# Patient Record
Sex: FEMALE | Race: WHITE | NOT HISPANIC OR LATINO | Employment: FULL TIME | URBAN - METROPOLITAN AREA
[De-identification: names, ages, dates, MRNs, and addresses within clinical notes are randomized per-mention and may not be internally consistent; named-entity substitution may affect disease eponyms.]

---

## 2017-02-27 ENCOUNTER — OFFICE VISIT (OUTPATIENT)
Dept: OBGYN | Facility: CLINIC | Age: 47
End: 2017-02-27
Payer: COMMERCIAL

## 2017-02-27 VITALS
SYSTOLIC BLOOD PRESSURE: 126 MMHG | HEIGHT: 66 IN | WEIGHT: 209.9 LBS | HEART RATE: 79 BPM | DIASTOLIC BLOOD PRESSURE: 82 MMHG | OXYGEN SATURATION: 98 % | BODY MASS INDEX: 33.73 KG/M2

## 2017-02-27 DIAGNOSIS — Z01.419 ENCOUNTER FOR GYNECOLOGICAL EXAMINATION WITHOUT ABNORMAL FINDING: Primary | ICD-10-CM

## 2017-02-27 DIAGNOSIS — D05.00 BREAST NEOPLASM, TIS (LCIS), UNSPECIFIED LATERALITY: ICD-10-CM

## 2017-02-27 LAB
CHOLEST SERPL-MCNC: 198 MG/DL
FERRITIN SERPL-MCNC: 63 NG/ML (ref 8–252)
HBA1C MFR BLD: 5.4 % (ref 4.3–6)
IRON SATN MFR SERPL: 36 % (ref 15–46)
IRON SERPL-MCNC: 104 UG/DL (ref 35–180)
TIBC SERPL-MCNC: 287 UG/DL (ref 240–430)
TSH SERPL DL<=0.05 MIU/L-ACNC: 1.42 MU/L (ref 0.4–4)

## 2017-02-27 PROCEDURE — 87389 HIV-1 AG W/HIV-1&-2 AB AG IA: CPT | Performed by: OBSTETRICS & GYNECOLOGY

## 2017-02-27 PROCEDURE — 87340 HEPATITIS B SURFACE AG IA: CPT | Performed by: OBSTETRICS & GYNECOLOGY

## 2017-02-27 PROCEDURE — 83550 IRON BINDING TEST: CPT | Performed by: OBSTETRICS & GYNECOLOGY

## 2017-02-27 PROCEDURE — 36415 COLL VENOUS BLD VENIPUNCTURE: CPT | Performed by: OBSTETRICS & GYNECOLOGY

## 2017-02-27 PROCEDURE — 83036 HEMOGLOBIN GLYCOSYLATED A1C: CPT | Performed by: OBSTETRICS & GYNECOLOGY

## 2017-02-27 PROCEDURE — 86803 HEPATITIS C AB TEST: CPT | Performed by: OBSTETRICS & GYNECOLOGY

## 2017-02-27 PROCEDURE — G0145 SCR C/V CYTO,THINLAYER,RESCR: HCPCS | Performed by: OBSTETRICS & GYNECOLOGY

## 2017-02-27 PROCEDURE — 86695 HERPES SIMPLEX TYPE 1 TEST: CPT | Performed by: OBSTETRICS & GYNECOLOGY

## 2017-02-27 PROCEDURE — 86780 TREPONEMA PALLIDUM: CPT | Performed by: OBSTETRICS & GYNECOLOGY

## 2017-02-27 PROCEDURE — 87624 HPV HI-RISK TYP POOLED RSLT: CPT | Performed by: OBSTETRICS & GYNECOLOGY

## 2017-02-27 PROCEDURE — 99396 PREV VISIT EST AGE 40-64: CPT | Performed by: OBSTETRICS & GYNECOLOGY

## 2017-02-27 PROCEDURE — 86696 HERPES SIMPLEX TYPE 2 TEST: CPT | Performed by: OBSTETRICS & GYNECOLOGY

## 2017-02-27 PROCEDURE — 82465 ASSAY BLD/SERUM CHOLESTEROL: CPT | Performed by: OBSTETRICS & GYNECOLOGY

## 2017-02-27 PROCEDURE — 87591 N.GONORRHOEAE DNA AMP PROB: CPT | Performed by: OBSTETRICS & GYNECOLOGY

## 2017-02-27 PROCEDURE — 87491 CHLMYD TRACH DNA AMP PROBE: CPT | Performed by: OBSTETRICS & GYNECOLOGY

## 2017-02-27 PROCEDURE — 83540 ASSAY OF IRON: CPT | Performed by: OBSTETRICS & GYNECOLOGY

## 2017-02-27 PROCEDURE — 84443 ASSAY THYROID STIM HORMONE: CPT | Performed by: OBSTETRICS & GYNECOLOGY

## 2017-02-27 PROCEDURE — 82728 ASSAY OF FERRITIN: CPT | Performed by: OBSTETRICS & GYNECOLOGY

## 2017-02-27 PROCEDURE — 82306 VITAMIN D 25 HYDROXY: CPT | Performed by: OBSTETRICS & GYNECOLOGY

## 2017-02-27 RX ORDER — METRONIDAZOLE 7.5 MG/G
LOTION TOPICAL
Refills: 3 | COMMUNITY
Start: 2017-01-19 | End: 2018-11-16

## 2017-02-27 ASSESSMENT — ANXIETY QUESTIONNAIRES
IF YOU CHECKED OFF ANY PROBLEMS ON THIS QUESTIONNAIRE, HOW DIFFICULT HAVE THESE PROBLEMS MADE IT FOR YOU TO DO YOUR WORK, TAKE CARE OF THINGS AT HOME, OR GET ALONG WITH OTHER PEOPLE: SOMEWHAT DIFFICULT
1. FEELING NERVOUS, ANXIOUS, OR ON EDGE: SEVERAL DAYS
2. NOT BEING ABLE TO STOP OR CONTROL WORRYING: SEVERAL DAYS
5. BEING SO RESTLESS THAT IT IS HARD TO SIT STILL: NOT AT ALL
3. WORRYING TOO MUCH ABOUT DIFFERENT THINGS: NOT AT ALL
7. FEELING AFRAID AS IF SOMETHING AWFUL MIGHT HAPPEN: NOT AT ALL
GAD7 TOTAL SCORE: 5
6. BECOMING EASILY ANNOYED OR IRRITABLE: MORE THAN HALF THE DAYS

## 2017-02-27 ASSESSMENT — PATIENT HEALTH QUESTIONNAIRE - PHQ9: 5. POOR APPETITE OR OVEREATING: SEVERAL DAYS

## 2017-02-27 NOTE — NURSING NOTE
"Chief Complaint   Patient presents with     Physical     would like labwork       Initial /82 (BP Location: Right arm, Patient Position: Chair, Cuff Size: Adult Large)  Pulse 79  Ht 1.676 m (5' 6\")  Wt 95.2 kg (209 lb 14.4 oz)  SpO2 98%  Breastfeeding? No  BMI 33.88 kg/m2 Estimated body mass index is 33.88 kg/(m^2) as calculated from the following:    Height as of this encounter: 1.676 m (5' 6\").    Weight as of this encounter: 95.2 kg (209 lb 14.4 oz).  Medication Reconciliation: complete   Madelyn Daigle, [facility]  February 27, 2017 1:16 PM        "

## 2017-02-27 NOTE — MR AVS SNAPSHOT
After Visit Summary   2/27/2017    Brianna Wall    MRN: [id number]           Patient Information     Date Of Birth          1970        Visit Information        Provider Department      2/27/2017 1:00 PM Kuldip Pritchett MD [facility]        Care Instructions                                                        If you have any questions regarding your visit, Please contact your care team.     [facility] Access Services: [phone]    [facility] CLINIC HOURS TELEPHONE NUMBER   Kuldip Pritchett M.D.      Brigette-    Rashi Anaya-DEVANTE Samson-Medical Assistant   Monday-[place]  8:00a.m-4:45 p.m  Wednesday-[place] 8:00a.m-4:45 p.m.  Thursday-[place]  8:00a.m-4:45 p.m.  Friday-[place]  8:00a.m-4:45 p.m. [facility]  [address] [place] [address] [address].  [place], [place] [address] [phone] ask for [facility]  [phone] Fax  Imaging [phone]    [facility]  [address] [place] [address] Dr.  [place], [place] [address] [phone]    [facility]  [address]  [place], [place] [address] [phone] ask [facility]  [phone] Fax  Imaging [phone]     Urgent Care locations:    [facility] Monday-Friday  5 pm - 9 pm  Saturday and Sunday   9 am - 5 pm    Monday-Friday   11 am - 9 pm  Saturday and Sunday   9 am - 5 pm   [phone]       If you need a medication refill, please contact your pharmacy. Please allow 3 business days for your refill to be completed.  As always, Thank you for trusting us with your healthcare needs!          Follow-ups after your visit        Who to contact     If you have questions or need follow up information about today's clinic visit or your schedule please contact [facility] directly at [phone].  Normal or non-critical lab and imaging results will be communicated to you by  "MyChart, letter or phone within 4 business days after the clinic has received the results. If you do not hear from us within 7 days, please contact the clinic through [employer] or phone. If you have a critical or abnormal lab result, we will notify you by phone as soon as possible.  Submit refill requests through [employer] or call your pharmacy and they will forward the refill request to us. Please allow 3 business days for your refill to be completed.          Additional Information About Your Visit        AMXhart Information     [employer] gives you secure access to your electronic health record. If you see a primary care provider, you can also send messages to your care team and make appointments. If you have questions, please call your primary care clinic.  If you do not have a primary care provider, please call [phone] and they will assist you.        Care EveryWhere ID     This is your Care EveryWhere ID. This could be used by other organizations to access your [place] medical records  [id number]        Your Vitals Were     Pulse Height Pulse Oximetry Breastfeeding? BMI (Body Mass Index)       79 1.676 m (5' 6\") 98% No 33.88 kg/m2        Blood Pressure from Last 3 Encounters:   02/27/17 126/82   01/13/16 112/76   01/08/16 100/62    Weight from Last 3 Encounters:   02/27/17 95.2 kg (209 lb 14.4 oz)   01/13/16 96.6 kg (213 lb)   01/08/16 96.6 kg (213 lb)              Today, you had the following     No orders found for display         Today's Medication Changes          These changes are accurate as of: 2/27/17  1:21 PM.  If you have any questions, ask your nurse or doctor.               Stop taking these medicines if you haven't already. Please contact your care team if you have questions.     fish oil-omega-3 fatty acids 1000 MG capsule   Stopped by:  Kuldip Pritchett MD                    Primary Care Provider Office Phone # Fax #    YUSUF Mast [phone]       [place] " [facility] [address]  [facility] [address]        Thank you!     Thank you for choosing [facility]  for your care. Our goal is always to provide you with excellent care. Hearing back from our patients is one way we can continue to improve our services. Please take a few minutes to complete the written survey that you may receive in the mail after your visit with us. Thank you!             Your Updated Medication List - Protect others around you: Learn how to safely use, store and throw away your medicines at www.disposemymeds.org.          This list is accurate as of: 2/27/17  1:21 PM.  Always use your most recent med list.                   Brand Name Dispense Instructions for use    ascorbic acid 1000 MG Tabs    vitamin C     3 tabs QD       melatonin 5 MG tablet      Reported on 2/27/2017       metroNIDAZOLE 0.75 % Lotn      ANG EXT AA D       MIRENA (52 MG) 20 MCG/24HR IUD   Generic drug:  levonorgestrel      1 each by Intrauterine route once       Multi-vitamin Tabs tablet   Generic drug:  multivitamin, therapeutic with minerals          VITAMIN D PO      Reported on 2/27/2017       vitamin E 1000 UNIT capsule    TOCOPHEROL     1 tab qd

## 2017-02-27 NOTE — PATIENT INSTRUCTIONS
If you have any questions regarding your visit, Please contact your care team.     [facility] Access Services: [phone]    [facility] CLINIC HOURS TELEPHONE NUMBER   RABIA Patterson-    Rashi Anaya-DEVANTE Samson-Medical Assistant   Monday-[place]  8:00a.m-4:45 p.m  Wednesday-[place] 8:00a.m-4:45 p.m.  Thursday-[place]  8:00a.m-4:45 p.m.  Friday-[place]  8:00a.m-4:45 p.m. [facility]  [address]  [place], [place] [address] [phone] ask [facility] [place]  [phone] Fax  Imaging [phone]    [facility] Delivery  [address] [place] [address] Dr.  [place], [place] [address] [phone]    [facility]  [address] [place] [address]  [phone] ask [facility] [place]  [phone] Fax  Imaging [phone]     Urgent Care locations:    [place]        [place] Monday-Friday  5 pm - 9 pm  Saturday and Sunday   9 am - 5 pm    Monday-Friday   11 am - 9 pm  Saturday and Sunday   9 am - 5 pm   [phone]       If you need a medication refill, please contact your pharmacy. Please allow 3 business days for your refill to be completed.  As always, Thank you for trusting us with your healthcare needs!

## 2017-02-28 LAB
C TRACH DNA SPEC QL NAA+PROBE: NORMAL
DEPRECATED CALCIDIOL+CALCIFEROL SERPL-MC: 21 UG/L (ref 20–75)
HBV SURFACE AG SERPL QL IA: NONREACTIVE
HCV AB SERPL QL IA: NORMAL
HIV 1+2 AB+HIV1 P24 AG SERPL QL IA: NORMAL
HSV1 IGG SERPL QL IA: 2.3 AI (ref 0–0.8)
HSV2 IGG SERPL QL IA: ABNORMAL AI (ref 0–0.8)
N GONORRHOEA DNA SPEC QL NAA+PROBE: NORMAL
SPECIMEN SOURCE: NORMAL
SPECIMEN SOURCE: NORMAL
T PALLIDUM IGG+IGM SER QL: NEGATIVE

## 2017-02-28 ASSESSMENT — ANXIETY QUESTIONNAIRES: GAD7 TOTAL SCORE: 5

## 2017-02-28 ASSESSMENT — PATIENT HEALTH QUESTIONNAIRE - PHQ9: SUM OF ALL RESPONSES TO PHQ QUESTIONS 1-9: 7

## 2017-03-02 LAB
COPATH REPORT: NORMAL
PAP: NORMAL

## 2017-03-02 NOTE — PROGRESS NOTES
"\"Lalo CM\" Brianna Wall is a 46 year old year old who presents for annual exam. No LMP recorded. Patient is not currently having periods (Reason: IUD).    HPI: Doing well.  Menses suppressed with Mirena.  Significant interval changes: none.  Current significant symptoms: some stress this past yr and irritability but managing and has new job position that is working well and finishing graduate studies.  Other problems or concerns: breast LCIS and needs follow-up digital mammogram. Did not have BRCA testing. Mother doing well after breast cancer treatment and here for annual exam too.    Contraceptive method is IUD. Desires STD scr tests since she has open marriage and other partners.    Past medical, obstetrical, surgical, family and social history reviewed and as noted or updated in chart.     Allergies, meds and supplements are as noted or updated in chart.     ROS:     Systems reviewed include constitutional; breast;                 cardiac; respiratory; gastrointestinal; genitourinary;                                musculoskeletal; integumentary; psychological;                                hematologic/lymphatic and endocrine.                  These systems were negative for significant symptoms except                 for the following additional: none ; see HPI.                                Exam:  VS as noted.                  Neck, nodes, breasts, abd and pelvis were                             normal or negative except for, or in particular noting, the following                pertinent findings: none.      Assessment: Gyn exam. Problem List updated.    Plan and Recommendations: Symptoms, problems and concerns reviewed. Health habits and vaccinations reviewed. Calcium/Vitamin D and multi-vitamin supplements instructions given. Breast/skin self-exam awareness. Screening tests including limitations discussed. Follow-up visits discussed. See orders. Mammogram regularly. RTC 1 year.    Kuldip Pritchett, " MD

## 2017-03-06 ENCOUNTER — HOSPITAL ENCOUNTER (OUTPATIENT)
Dept: MAMMOGRAPHY | Facility: CLINIC | Age: 47
Discharge: HOME | End: 2017-03-06
Attending: OBSTETRICS & GYNECOLOGY | Admitting: OBSTETRICS & GYNECOLOGY
Payer: COMMERCIAL

## 2017-03-06 DIAGNOSIS — D05.00 BREAST NEOPLASM, TIS (LCIS), UNSPECIFIED LATERALITY: ICD-10-CM

## 2017-03-06 LAB
FINAL DIAGNOSIS: NORMAL
HPV HR 12 DNA CVX QL NAA+PROBE: NEGATIVE
HPV16 DNA SPEC QL NAA+PROBE: NEGATIVE
HPV18 DNA SPEC QL NAA+PROBE: NEGATIVE
SPECIMEN DESCRIPTION: NORMAL

## 2017-03-06 PROCEDURE — G0204 DX MAMMO INCL CAD BI: HCPCS

## 2017-07-19 ENCOUNTER — OFFICE VISIT (OUTPATIENT)
Dept: FAMILY MEDICINE | Facility: CLINIC | Age: 47
End: 2017-07-19
Payer: COMMERCIAL

## 2017-07-19 VITALS
WEIGHT: 199 LBS | OXYGEN SATURATION: 99 % | RESPIRATION RATE: 12 BRPM | BODY MASS INDEX: 31.98 KG/M2 | DIASTOLIC BLOOD PRESSURE: 74 MMHG | HEART RATE: 76 BPM | SYSTOLIC BLOOD PRESSURE: 110 MMHG | HEIGHT: 66 IN | TEMPERATURE: 98.1 F

## 2017-07-19 DIAGNOSIS — H10.31 ACUTE CONJUNCTIVITIS OF RIGHT EYE, UNSPECIFIED ACUTE CONJUNCTIVITIS TYPE: Primary | ICD-10-CM

## 2017-07-19 PROCEDURE — 99213 OFFICE O/P EST LOW 20 MIN: CPT | Performed by: NURSE PRACTITIONER

## 2017-07-19 RX ORDER — POLYMYXIN B SULFATE AND TRIMETHOPRIM 1; 10000 MG/ML; [USP'U]/ML
1 SOLUTION OPHTHALMIC
Qty: 1 BOTTLE | Refills: 0 | Status: SHIPPED | OUTPATIENT
Start: 2017-07-19 | End: 2017-07-26

## 2017-07-19 NOTE — MR AVS SNAPSHOT
"              After Visit Summary   7/19/2017    Brianna Wall    MRN: [id number]           Patient Information     Date Of Birth          1970        Visit Information        Provider Department      7/19/2017 2:15 PM Haase, Susan Rachele, APRN CNP [facility]        Today's Diagnoses     Acute conjunctivitis of right eye, unspecified acute conjunctivitis type    -  1       Follow-ups after your visit        Who to contact     If you have questions or need follow up information about today's clinic visit or your schedule please contact [facility] directly at [phone].  Normal or non-critical lab and imaging results will be communicated to you by MyChart, letter or phone within 4 business days after the clinic has received the results. If you do not hear from us within 7 days, please contact the clinic through [employer] or phone. If you have a critical or abnormal lab result, we will notify you by phone as soon as possible.  Submit refill requests through [employer] or call your pharmacy and they will forward the refill request to us. Please allow 3 business days for your refill to be completed.          Additional Information About Your Visit        MyChart Information     [employer] gives you secure access to your electronic health record. If you see a primary care provider, you can also send messages to your care team and make appointments. If you have questions, please call your primary care clinic.  If you do not have a primary care provider, please call [phone] and they will assist you.        Care EveryWhere ID     This is your Care EveryWhere ID. This could be used by other organizations to access your [place] medical records  [id number]        Your Vitals Were     Pulse Temperature Respirations Height Pulse Oximetry BMI (Body Mass Index)    76 98.1  F (36.7  C) (Oral) 12 5' 6\" (1.676 m) 99% 32.12 kg/m2       Blood Pressure from Last 3 Encounters: "   07/19/17 110/74   02/27/17 126/82   01/13/16 112/76    Weight from Last 3 Encounters:   07/19/17 199 lb (90.3 kg)   02/27/17 209 lb 14.4 oz (95.2 kg)   01/13/16 213 lb (96.6 kg)              Today, you had the following     No orders found for display         Today's Medication Changes          These changes are accurate as of: 7/19/17  3:00 PM.  If you have any questions, ask your nurse or doctor.               Start taking these medicines.        Dose/Directions    trimethoprim-polymyxin b ophthalmic solution   Commonly known as:  POLYTRIM   Used for:  Acute conjunctivitis of right eye, unspecified acute conjunctivitis type   Started by:  Haase, Susan Rachele, APRN CNP        Dose:  1 drop   Place 1 drop into the right eye every 3 hours for 7 days   Quantity:  1 Bottle   Refills:  0            Where to get your medicines      These medications were sent to [facility] [address]  [address], [facility] [address]     Phone:  [phone]     trimethoprim-polymyxin b ophthalmic solution                Primary Care Provider Office Phone # Fax #    Diana YUSUF Cha [phone]       [facility] [address] [facility] [address]        Equal Access to Services     DAMION ESCOTO AH: Hadii aad ku hadasho Soomaali, waaxda luqadaha, qaybta kaalmada adeegyada, karen marley. So [facility] [phone].    ATENCIÓN: Si habla español, tiene a vallejo disposición servicios gratuitos de asistencia lingüística. Jose Eduardo al [phone].    We comply with applicable federal civil rights laws and [place] laws. We do not discriminate on the basis of race, color, national origin, age, disability sex, sexual orientation or gender identity.            Thank you!     Thank you for choosing [facility]  for your care. Our goal is always to provide you with excellent care.  Hearing back from our patients is one way we can continue to improve our services. Please take a few minutes to complete the written survey that you may receive in the mail after your visit with us. Thank you!             Your Updated Medication List - Protect others around you: Learn how to safely use, store and throw away your medicines at www.disposemymeds.org.          This list is accurate as of: 7/19/17  3:00 PM.  Always use your most recent med list.                   Brand Name Dispense Instructions for use Diagnosis    ascorbic acid 1000 MG Tabs    vitamin C     3 tabs QD        melatonin 5 MG tablet      Reported on 2/27/2017        metroNIDAZOLE 0.75 % Lotn      ANG EXT AA D        MIRENA (52 MG) 20 MCG/24HR IUD   Generic drug:  levonorgestrel      1 each by Intrauterine route once        Multi-vitamin Tabs tablet   Generic drug:  multivitamin, therapeutic with minerals           trimethoprim-polymyxin b ophthalmic solution    POLYTRIM    1 Bottle    Place 1 drop into the right eye every 3 hours for 7 days    Acute conjunctivitis of right eye, unspecified acute conjunctivitis type       VITAMIN D PO      Reported on 2/27/2017        vitamin E 1000 UNIT capsule    TOCOPHEROL     1 tab qd    Lyme disease

## 2017-07-19 NOTE — PROGRESS NOTES
SUBJECTIVE:                                                    Brianna Wall is a 47 year old female who presents to clinic today for the following health issues:    Eye(s) Problem    Duration: 1 day    Description:  Location: right  Pain: no  Redness: no  Discharge: YES- watery    Accompanying signs and symptoms: none    History (Trauma, foreign body exposure,): None    Precipitating or alleviating factors (contact use): None    Therapies tried and outcome: None    Right eye irritated feeling, tearing with redness.  Denies pain, itching, photophobia or purulent discharge.  Does not wear contacts or corrective lenses.  Denies working in an environment with potential for foreign body of the eye.  Family cat with a cold and purulent eye drainage.     VISION   No corrective lenses  Tool used: Mckeon   Right eye:   20/25       Left eye:         20/25   Visual Acuity: Pass    Problem list and histories reviewed & adjusted, as indicated.  Additional history: as documented    Patient Active Problem List   Diagnosis     CARDIOVASCULAR SCREENING; LDL GOAL LESS THAN 160     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     Past Surgical History:   Procedure Laterality Date     BIOPSY BREAST NEEDLE LOCALIZATION  12/31/2012    Procedure: BIOPSY BREAST NEEDLE LOCALIZATION;  Left Breast Wire Localized Biopsy;  Surgeon: Dinah Lincoln MD;  Location: [facility] OR     BREAST SURGERY  2012    LCIS     C NONSPECIFIC PROCEDURE      tendon transfer  left wrist.     COLONOSCOPY  2012    neg     HC INSERTION INTRAUTERINE DEVICE  2013    Mirena     LASIK  2003       Social History   Substance Use Topics     Smoking status: Never Smoker     Smokeless tobacco: Never Used     Alcohol use Yes      Comment: 1-2 drinks/month     Family History   Problem Relation Age of Onset     Neurologic Disorder Paternal Grandfather      MS     CEREBROVASCULAR DISEASE Paternal Grandmother      Hypertension Paternal Grandmother      HEART DISEASE  "Maternal Grandfather      Enlarged heart     Thyroid Disease Maternal Grandfather      Cancer - colorectal Maternal Grandfather      Prostate Cancer Maternal Grandfather      Breast Cancer Mother      Infiltrating lobular breast cancer, Infiltrating ductal breast cancer, BRCA 1&2 neg     Thyroid Disease Mother      Connective Tissue Disorder Mother      RA     Thyroid Disease Maternal Grandmother      Breast Cancer Maternal Grandmother      Blood Disease Father      hemochromatosis     Connective Tissue Disorder Father      gout     DIABETES Father      Autoimmune Disease Father      [id number]     Breast Cancer Maternal Aunt          Current Outpatient Prescriptions   Medication Sig Dispense Refill     trimethoprim-polymyxin b (POLYTRIM) ophthalmic solution Place 1 drop into the right eye every 3 hours for 7 days 1 Bottle 0     metroNIDAZOLE 0.75 % LOTN ANG EXT AA D  3     levonorgestrel (MIRENA) 20 MCG/24HR IUD 1 each by Intrauterine route once       melatonin 5 MG TABS Reported on 2/27/2017       VITAMIN D PO Reported on 2/27/2017       VITAMIN E 1000 UNIT OR CAPS 1 tab qd       MULTI-VITAMIN OR TABS        VITAMIN C 1000 MG OR TABS 3 tabs QD       Allergies   Allergen Reactions     Penicillins Hives     Reviewed and updated as needed this visit by clinical staff  Reviewed and updated as needed this visit by Provider    ROS:  Constitutional, HEENT, cardiovascular, pulmonary, GI, , musculoskeletal, neuro, skin, endocrine and psych systems are negative, except as otherwise noted.  OBJECTIVE:   /74 (BP Location: Left arm, Patient Position: Chair, Cuff Size: Adult Regular)  Pulse 76  Temp 98.1  F (36.7  C) (Oral)  Resp 12  Ht 1.676 m (5' 6\")  Wt 90.3 kg (199 lb)  SpO2 99%  BMI 32.12 kg/m2  Body mass index is 32.12 kg/(m^2).  GENERAL: healthy, alert and no distress  EYES: Right eye with conjunctiva injected, normal to inspection, PERRL and and sclerae normal. Eye exam:  2 drops of proparacaine placed in " left eye, fluorescein stain applied, eye examined using blue light,no uptake.  Left eye normal.   HENT: ear canals and TM's normal, nose and mouth without ulcers or lesions  NECK: no adenopathy, no asymmetry, masses, or scars and thyroid normal to palpation  Diagnostic Test Results:  none     ASSESSMENT/PLAN:   Brianna was seen today for eye problem.    Diagnoses and all orders for this visit:    Acute conjunctivitis of right eye, unspecified acute conjunctivitis type:  Negative eye intake for foreign body, corneal abrasion, vision 20/25, no eye pain.  Will start on Polytrim for possible conjunctivitis, informed to follow up with eye provider tomorrow if no improvement.  -     trimethoprim-polymyxin b (POLYTRIM) ophthalmic solution; Place 1 drop into the right eye every 3 hours for 7 days  -     EYE EXAM (SIMPLE-NONBILLABLE)    Follow up visit in 1 day with eye clinic if eye does not improve, sooner as needed.     The information in this document, created by the medical scribe for me, accurately reflects the services I personally performed and the decisions made by me. I have reviewed and approved this document for accuracy.   Susan Haase, APRN [facility]

## 2017-07-19 NOTE — NURSING NOTE
"Chief Complaint   Patient presents with     Eye Problem       Initial /74 (BP Location: Left arm, Patient Position: Chair, Cuff Size: Adult Regular)  Pulse 76  Temp 98.1  F (36.7  C) (Oral)  Resp 12  Ht 5' 6\" (1.676 m)  Wt 199 lb (90.3 kg)  SpO2 99%  BMI 32.12 kg/m2 Estimated body mass index is 32.12 kg/(m^2) as calculated from the following:    Height as of this encounter: 5' 6\" (1.676 m).    Weight as of this encounter: 199 lb (90.3 kg).  Medication Reconciliation: complete   Danelle Page CMA      "

## 2017-12-21 ENCOUNTER — OFFICE VISIT (OUTPATIENT)
Dept: FAMILY MEDICINE | Facility: CLINIC | Age: 47
End: 2017-12-21
Payer: COMMERCIAL

## 2017-12-21 ENCOUNTER — RADIANT APPOINTMENT (OUTPATIENT)
Dept: GENERAL RADIOLOGY | Facility: CLINIC | Age: 47
End: 2017-12-21
Attending: PHYSICIAN ASSISTANT
Payer: COMMERCIAL

## 2017-12-21 VITALS
DIASTOLIC BLOOD PRESSURE: 62 MMHG | TEMPERATURE: 98.9 F | WEIGHT: 201 LBS | BODY MASS INDEX: 32.3 KG/M2 | OXYGEN SATURATION: 98 % | HEART RATE: 86 BPM | HEIGHT: 66 IN | RESPIRATION RATE: 16 BRPM | SYSTOLIC BLOOD PRESSURE: 108 MMHG

## 2017-12-21 DIAGNOSIS — M25.561 ACUTE PAIN OF RIGHT KNEE: ICD-10-CM

## 2017-12-21 DIAGNOSIS — M25.561 ACUTE PAIN OF RIGHT KNEE: Primary | ICD-10-CM

## 2017-12-21 PROCEDURE — 73562 X-RAY EXAM OF KNEE 3: CPT | Mod: RT

## 2017-12-21 PROCEDURE — 99213 OFFICE O/P EST LOW 20 MIN: CPT | Performed by: PHYSICIAN ASSISTANT

## 2017-12-21 NOTE — PROGRESS NOTES
SUBJECTIVE:   Brianna Wall is a 47 year old female who presents to clinic today for the following health issues:      Musculoskeletal problem/pain      Duration: x 2 days    Description  Location: right knee    Intensity:  severe    Accompanying signs and symptoms: swelling and any ROM is very painful    History  Previous similar problem: no   Previous evaluation:  none    Precipitating or alleviating factors:  Trauma or overuse: YES- stepped off curb incorrectly, landed on both knees, left knee is fine  Aggravating factors include: bending, any ROM, walking up stairs    Therapies tried and outcome: rest/inactivity, ice, acetaminophen and Ibuprofen; helps a little    Patient is here today complaining of right knee pain  She stepped off the sidewalk two days ago and fell on both knees  Left knee is fine but her right knee is swollen and painful  She states it doesn't hurt if straight and not moving, but pain is 8/10 when trying to bend and bare weight  Using ice, Ibuprofen, Tylenol and rest with little improvement      Problem list and histories reviewed & adjusted, as indicated.  Additional history: as documented    Patient Active Problem List   Diagnosis     CARDIOVASCULAR SCREENING; LDL GOAL LESS THAN 160     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     Past Surgical History:   Procedure Laterality Date     BIOPSY BREAST NEEDLE LOCALIZATION  12/31/2012    Procedure: BIOPSY BREAST NEEDLE LOCALIZATION;  Left Breast Wire Localized Biopsy;  Surgeon: Dinah Lincoln MD;  Location: [facility] OR     BREAST SURGERY  2012    LCIS     C NONSPECIFIC PROCEDURE      tendon transfer  left wrist.     COLONOSCOPY  2012    neg     HC INSERTION INTRAUTERINE DEVICE  2013    Mirena     LASIK  2003       Social History   Substance Use Topics     Smoking status: Never Smoker     Smokeless tobacco: Never Used     Alcohol use Yes      Comment: 1-2 drinks/month     Family History   Problem Relation Age of Onset     Neurologic  "Disorder Paternal Grandfather      MS     CEREBROVASCULAR DISEASE Paternal Grandmother      Hypertension Paternal Grandmother      HEART DISEASE Maternal Grandfather      Enlarged heart     Thyroid Disease Maternal Grandfather      Cancer - colorectal Maternal Grandfather      Prostate Cancer Maternal Grandfather      Breast Cancer Mother      Infiltrating lobular breast cancer, Infiltrating ductal breast cancer, BRCA 1&2 neg     Thyroid Disease Mother      Connective Tissue Disorder Mother      RA     Thyroid Disease Maternal Grandmother      Breast Cancer Maternal Grandmother      Blood Disease Father      hemochromatosis     Connective Tissue Disorder Father      gout     DIABETES Father      Autoimmune Disease Father      [id number]     Breast Cancer Maternal Aunt          Current Outpatient Prescriptions   Medication Sig Dispense Refill     vitamin B complex with vitamin C (VITAMIN  B COMPLEX) TABS tablet Take 1 tablet by mouth daily       metroNIDAZOLE 0.75 % LOTN ANG EXT AA D  3     levonorgestrel (MIRENA) 20 MCG/24HR IUD 1 each by Intrauterine route once       melatonin 5 MG TABS Reported on 2/27/2017       VITAMIN D PO Reported on 2/27/2017       VITAMIN E 1000 UNIT OR CAPS 1 tab qd       MULTI-VITAMIN OR TABS        VITAMIN C 1000 MG OR TABS 3 tabs QD       Allergies   Allergen Reactions     Penicillins Hives         Reviewed and updated as needed this visit by clinical staffTobacco  Allergies  Med Hx  Surg Hx  Fam Hx  Soc Hx      Reviewed and updated as needed this visit by Provider         ROS:  Constitutional, HEENT, cardiovascular, pulmonary, gi and gu systems are negative, except as otherwise noted.      OBJECTIVE:   /62 (BP Location: Right arm, Patient Position: Chair, Cuff Size: Adult Regular)  Pulse 86  Temp 98.9  F (37.2  C) (Oral)  Resp 16  Ht 5' 6\" (1.676 m)  Wt 201 lb (91.2 kg)  LMP  (LMP Unknown)  SpO2 98%  Breastfeeding? No  BMI 32.44 kg/m2  Body mass index is 32.44 " kg/(m^2).  GENERAL: healthy, alert and no distress  NECK: no adenopathy, no asymmetry, masses, or scars and thyroid normal to palpation  RESP: lungs clear to auscultation - no rales, rhonchi or wheezes  CV: regular rate and rhythm, normal S1 S2, no S3 or S4, no murmur, click or rub, no peripheral edema and peripheral pulses strong  MS: RLE exam shows + right knee swelling, limited active and passive ROM due to pain, patient unable to fully bend the knee, very limited exam due to pain  SKIN: no suspicious lesions or rashes    Diagnostic Test Results:  Xray - negative of right knee    ASSESSMENT/PLAN:             1. Acute pain of right knee  New problem, xray negative for fracture. Suspect deep tissue bruise though truly hard to fully examine so there could be ligament or tendon damage. Recommend RICE. Advised f/u in 1 week if symptoms worsen or do not improve.  - XR Knee Right 3 Views; Future    Risks, benefits and alternatives were discussed with patient. Agreeable to the plan of care.      Samara Wang PA-C  [facility]

## 2017-12-21 NOTE — MR AVS SNAPSHOT
"              After Visit Summary   12/21/2017    Brianna Wall    MRN: [id number]           Patient Information     Date Of Birth          1970        Visit Information        Provider Department      12/21/2017 8:50 AM Samara Wang PA-C [facility]        Today's Diagnoses     Acute pain of right knee    -  1       Follow-ups after your visit        Who to contact     If you have questions or need follow up information about today's clinic visit or your schedule please contact [facility] directly at [phone].  Normal or non-critical lab and imaging results will be communicated to you by [employer], letter or phone within 4 business days after the clinic has received the results. If you do not hear from us within 7 days, please contact the clinic through [employer] or phone. If you have a critical or abnormal lab result, we will notify you by phone as soon as possible.  Submit refill requests through [employer] or call your pharmacy and they will forward the refill request to us. Please allow 3 business days for your refill to be completed.          Additional Information About Your Visit        MyChart Information     [employer] gives you secure access to your electronic health record. If you see a primary care provider, you can also send messages to your care team and make appointments. If you have questions, please call your primary care clinic.  If you do not have a primary care provider, please call [phone] and they will assist you.        Care EveryWhere ID     This is your Care EveryWhere ID. This could be used by other organizations to access your [place] medical records  [id number]        Your Vitals Were     Pulse Temperature Respirations Height Last Period Pulse Oximetry    86 98.9  F (37.2  C) (Oral) 16 5' 6\" (1.676 m) (LMP Unknown) 98%    Breastfeeding? BMI (Body Mass Index)                No 32.44 kg/m2           Blood Pressure from Last 3 " Encounters:   12/21/17 108/62   07/19/17 110/74   02/27/17 126/82    Weight from Last 3 Encounters:   12/21/17 201 lb (91.2 kg)   07/19/17 199 lb (90.3 kg)   02/27/17 209 lb 14.4 oz (95.2 kg)               Primary Care Provider Office Phone # Fax #    YUSUF Mast [phone]       [facility] [address]  [facility] [address]        Equal Access to Services     [facility] Center[facility]RA[facility] : Hadii aad ku hadasho Soomaali, waaxda luqadaha, qaybta kaalmada adeegyada, waxay ezekielin hayaamesfin rubin . So [facility] [phone].    ATENCIÓN: Si habla español, tiene a vallejo disposición servicios gratuitos de asistencia lingüística. Llame al [phone].    We comply with applicable federal civil rights laws and [place] laws. We do not discriminate on the basis of race, color, national origin, age, disability, sex, sexual orientation, or gender identity.            Thank you!     Thank you for choosing [facility]  for your care. Our goal is always to provide you with excellent care. Hearing back from our patients is one way we can continue to improve our services. Please take a few minutes to complete the written survey that you may receive in the mail after your visit with us. Thank you!             Your Updated Medication List - Protect others around you: Learn how to safely use, store and throw away your medicines at www.disposemymeds.org.          This list is accurate as of: 12/21/17  9:33 AM.  Always use your most recent med list.                   Brand Name Dispense Instructions for use Diagnosis    ascorbic acid 1000 MG Tabs    vitamin C     3 tabs QD        melatonin 5 MG tablet      Reported on 2/27/2017        metroNIDAZOLE 0.75 % Lotn      ANG EXT AA D        MIRENA (52 MG) 20 MCG/24HR IUD   Generic drug:  levonorgestrel      1 each by Intrauterine route once        Multi-vitamin Tabs tablet   Generic drug:  multivitamin, therapeutic with minerals            vitamin B complex with vitamin C Tabs tablet      Take 1 tablet by mouth daily        VITAMIN D PO      Reported on 2/27/2017        vitamin E 1000 UNIT capsule    TOCOPHEROL     1 tab qd    Lyme disease

## 2017-12-21 NOTE — NURSING NOTE
"Chief Complaint   Patient presents with     Knee Pain       Initial /62 (BP Location: Right arm, Patient Position: Chair, Cuff Size: Adult Regular)  Pulse 86  Temp 98.9  F (37.2  C) (Oral)  Resp 16  Ht 5' 6\" (1.676 m)  Wt 201 lb (91.2 kg)  LMP  (LMP Unknown)  SpO2 98%  Breastfeeding? No  BMI 32.44 kg/m2 Estimated body mass index is 32.44 kg/(m^2) as calculated from the following:    Height as of this encounter: 5' 6\" (1.676 m).    Weight as of this encounter: 201 lb (91.2 kg).  Medication Reconciliation: complete   Chela Singleton CMA (AAMA)    "

## 2018-05-06 ENCOUNTER — HEALTH MAINTENANCE LETTER (OUTPATIENT)
Age: 48
End: 2018-05-06

## 2018-07-03 ENCOUNTER — NURSE TRIAGE (OUTPATIENT)
Dept: NURSING | Facility: CLINIC | Age: 48
End: 2018-07-03

## 2018-07-03 ENCOUNTER — OFFICE VISIT (OUTPATIENT)
Dept: FAMILY MEDICINE | Facility: CLINIC | Age: 48
End: 2018-07-03
Payer: COMMERCIAL

## 2018-07-03 VITALS
SYSTOLIC BLOOD PRESSURE: 118 MMHG | BODY MASS INDEX: 32.86 KG/M2 | OXYGEN SATURATION: 98 % | TEMPERATURE: 98.1 F | WEIGHT: 203.6 LBS | HEART RATE: 78 BPM | DIASTOLIC BLOOD PRESSURE: 70 MMHG

## 2018-07-03 DIAGNOSIS — T63.481A LOCAL REACTION TO INSECT STING, ACCIDENTAL OR UNINTENTIONAL, INITIAL ENCOUNTER: Primary | ICD-10-CM

## 2018-07-03 DIAGNOSIS — R21 RASH: ICD-10-CM

## 2018-07-03 DIAGNOSIS — Z23 NEED FOR VACCINATION: ICD-10-CM

## 2018-07-03 PROCEDURE — 90471 IMMUNIZATION ADMIN: CPT | Performed by: PHYSICIAN ASSISTANT

## 2018-07-03 PROCEDURE — 99213 OFFICE O/P EST LOW 20 MIN: CPT | Mod: 25 | Performed by: PHYSICIAN ASSISTANT

## 2018-07-03 PROCEDURE — 90715 TDAP VACCINE 7 YRS/> IM: CPT | Performed by: PHYSICIAN ASSISTANT

## 2018-07-03 RX ORDER — METHYLPREDNISOLONE 4 MG
TABLET, DOSE PACK ORAL
Qty: 21 TABLET | Refills: 0 | Status: SHIPPED | OUTPATIENT
Start: 2018-07-03 | End: 2018-11-16

## 2018-07-03 NOTE — MR AVS SNAPSHOT
After Visit Summary   7/3/2018    Brianna Wall    MRN: [id number]           Patient Information     Date Of Birth          1970        Visit Information        Provider Department      7/3/2018 11:40 AM Jose Luis Vidal PA-C [facility]        Today's Diagnoses     Local reaction to insect sting, accidental or unintentional, initial encounter    -  1    Rash        Need for vaccination           Follow-ups after your visit        Follow-up notes from your care team     Return in about 1 week (around 7/10/2018), or 1, for recheck if symptoms are not improving..      Your next 10 appointments already scheduled     Aug 13, 2018  8:00 AM CDT   LAB with [facility]   [facility]    [address] [place] [id number] [phone]           Please do not eat 10-12 hours before your appointment if you are coming in fasting for labs on lipids, cholesterol, or glucose (sugar). This does not apply to pregnant women. Water, hot tea and black coffee (with nothing added) are okay. Do not drink other fluids, diet soda or chew gum.            Aug 13, 2018  1:30 PM CDT   PHYSICAL with Kuldip Pritchett MD   [facility]    [id number] [place] [id number] [phone]              Who to contact     If you have questions or need follow up information about today's clinic visit or your schedule please contact [facility] directly at [phone].  Normal or non-critical lab and imaging results will be communicated to you by MyChart, letter or phone within 4 business days after the clinic has received the results. If you do not hear from us within 7 days, please contact the clinic through [employer] or phone. If you have a critical or abnormal lab result, we will notify you by phone as soon as possible.  Submit refill requests through [employer] or  call your pharmacy and they will forward the refill request to us. Please allow 3 business days for your refill to be completed.          Additional Information About Your Visit        MyChart Information     [employer] gives you secure access to your electronic health record. If you see a primary care provider, you can also send messages to your care team and make appointments. If you have questions, please call your primary care clinic.  If you do not have a primary care provider, please call [phone] and they will assist you.        Care EveryWhere ID     This is your Care EveryWhere ID. This could be used by other organizations to access your [place] medical records  [id number]        Your Vitals Were     Pulse Temperature Pulse Oximetry BMI (Body Mass Index)          78 98.1  F (36.7  C) (Oral) 98% 32.86 kg/m2         Blood Pressure from Last 3 Encounters:   07/03/18 118/70   12/21/17 108/62   07/19/17 110/74    Weight from Last 3 Encounters:   07/03/18 203 lb 9.6 oz (92.4 kg)   12/21/17 201 lb (91.2 kg)   07/19/17 199 lb (90.3 kg)              We Performed the Following     ADMIN 1st VACCINE     TDAP, IM (10 - 64 YRS) - Adacel        Primary Care Provider Office Phone # Fax #    Diana JacobsYUSUF carreon [phone] [address] [address]        Equal Access to Services     [facility] Center[facility] : Hadii aad ku hadasho Soomaali, waaxda luqadaha, qaybta kaalmada adeegyada, karen rubin . So [facility] [phone].    ATENCIÓN: Si habla español, tiene a vallejo disposición servicios gratuitos de asistencia lingüística. Jose Eduardo al [phone].    We comply with applicable federal civil rights laws and [place] laws. We do not discriminate on the basis of race, color, national origin, age, disability, sex, sexual orientation, or gender identity.            Thank you!     Thank you for choosing [facility]  for your care. Our goal is always to  provide you with excellent care. Hearing back from our patients is one way we can continue to improve our services. Please take a few minutes to complete the written survey that you may receive in the mail after your visit with us. Thank you!             Your Updated Medication List - Protect others around you: Learn how to safely use, store and throw away your medicines at www.disposemymeds.org.          This list is accurate as of 7/3/18 12:02 PM.  Always use your most recent med list.                   Brand Name Dispense Instructions for use Diagnosis    ascorbic acid 1000 MG Tabs    vitamin C     3 tabs QD        melatonin 5 MG tablet      Reported on 2/27/2017        metroNIDAZOLE 0.75 % Lotn      ANG EXT AA D        MIRENA (52 MG) 20 MCG/24HR IUD   Generic drug:  levonorgestrel      1 each by Intrauterine route once        Multi-vitamin Tabs tablet   Generic drug:  multivitamin, therapeutic with minerals           vitamin B complex with vitamin C Tabs tablet      Take 1 tablet by mouth daily        VITAMIN D PO      Reported on 2/27/2017        vitamin E 1000 UNIT capsule    TOCOPHEROL     1 tab qd    Lyme disease

## 2018-07-03 NOTE — TELEPHONE ENCOUNTER
Patient states was stung 7/1/18 morning by a wasp/hornet and developed 2 welts that evening.  The welts are on right buttocks area and right thigh.  Patient states the welts are very itchy and increasing in size.  Has appointment at 11:40AM this date.  Reason for Disposition    [1] Red or very tender (to touch) area AND [2] getting larger over 48 hours after the sting    Additional Information    Negative: [1] Life-threatening reaction (anaphylaxis) in the past to sting AND [2] < 2 hours since sting    Negative: Attacked by swarm of bees now    Negative: Passed out (i.e., lost consciousness, collapsed and was not responding)    Negative: Wheezing, stridor, or difficulty breathing    Negative: [1] Hoarseness or cough AND [2] sudden onset following sting    Negative: [1] Tightness in the throat or chest AND [2] sudden onset following sting    Negative: [1] Swollen tongue or difficulty swallowing AND [2] sudden onset following sting    Negative: Sounds like a life-threatening emergency to the triager    Negative: Not a bee, wasp, hornet, or yellow jacket sting    Negative: [1] Widespread hives, itching or facial swelling AND [2] started within 2 hours of sting (Exception: only at site of sting)    Negative: [1] Vomiting or abdominal cramps AND [2] started within 2 hours of sting    Negative: [1] Gave epinephrine shot AND [2] no symptoms now    Negative: Sting inside the mouth    Negative: Sting on eyeball (e.g., cornea)    Negative: Patient sounds very sick or weak to the triager    Negative: More than 50 stings    Negative: [1] Fever AND [2] red area    Negative: [1] Fever AND [2] area is very tender to touch    Negative: [1] Red streak or red line AND [2] length > 2 inches (5 cm)    Negative: [1] Red or very tender (to touch) area AND [2] started over 24 hours after the sting    Protocols used: BEE OR YELLOW JACKET STING-ADULT-

## 2018-07-03 NOTE — PROGRESS NOTES
SUBJECTIVE:   Brianna Wall is a 48 year old female who presents to clinic today for the following health issues:    Wasp Sting    Duration: 3 days ago    Description (location/character/radiation): Right upper thigh and buttocks- 2 stings?    Intensity:  moderate    Accompanying signs and symptoms: Swelling, itching    History (similar episodes/previous evaluation): None    Precipitating or alleviating factors: None    Therapies tried and outcome: Baking soda, hydrocortisone, alcohol, antihistamines with no relief      -Patient is a 49yo female with a recent hx of insect sting on Sunday morning  -She was simply walking [place] cabin and wearing shorts when suddenly was stung twice  -she put alcohol over the area but things seemed to calm down  -each successive day she has noted increased swelling, rash  -has never had similar reaction  -there is no difficulty breathing  -she denies numbness/tingling or swelling in the lips, tongue or throat  -no fevers, chills  -she has used topical steroid, baking soda, ice and using antihistamine for itch      Problem list and histories reviewed & adjusted, as indicated.  Additional history: as documented    Patient Active Problem List   Diagnosis     CARDIOVASCULAR SCREENING; LDL GOAL LESS THAN 160     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     Past Surgical History:   Procedure Laterality Date     BIOPSY BREAST NEEDLE LOCALIZATION  12/31/2012    Procedure: BIOPSY BREAST NEEDLE LOCALIZATION;  Left Breast Wire Localized Biopsy;  Surgeon: Dinah Lincoln MD;  Location: [facility] OR     BREAST SURGERY  2012    LCIS     C NONSPECIFIC PROCEDURE      tendon transfer  left wrist.     COLONOSCOPY  2012    neg     HC INSERTION INTRAUTERINE DEVICE  2013    Mirena     LASIK  2003       Social History   Substance Use Topics     Smoking status: Never Smoker     Smokeless tobacco: Never Used     Alcohol use Yes      Comment: 1-2 drinks/month     Family History   Problem  Relation Age of Onset     Neurologic Disorder Paternal Grandfather      MS     Cerebrovascular Disease Paternal Grandmother      Hypertension Paternal Grandmother      HEART DISEASE Maternal Grandfather      Enlarged heart     Thyroid Disease Maternal Grandfather      Cancer - colorectal Maternal Grandfather      Prostate Cancer Maternal Grandfather      Breast Cancer Mother      Infiltrating lobular breast cancer, Infiltrating ductal breast cancer, BRCA 1&2 neg     Thyroid Disease Mother      Connective Tissue Disorder Mother      RA     Thyroid Disease Maternal Grandmother      Breast Cancer Maternal Grandmother      Blood Disease Father      hemochromatosis     Connective Tissue Disorder Father      gout     Diabetes Father      Autoimmune Disease Father      [id number]     Breast Cancer Maternal Aunt            Reviewed and updated as needed this visit by clinical staff  Tobacco  Allergies  Meds  Med Hx  Surg Hx  Fam Hx  Soc Hx      Reviewed and updated as needed this visit by Provider            ROS:  Constitutional, HEENT, cardiovascular, pulmonary, gi and gu systems are negative, except as otherwise noted.    OBJECTIVE:     /70  Pulse 78  Temp 98.1  F (36.7  C) (Oral)  Wt 203 lb 9.6 oz (92.4 kg)  SpO2 98%  BMI 32.86 kg/m2  Body mass index is 32.86 kg/(m^2).  GENERAL: healthy, alert and no distress  HENT: nose and mouth without ulcers or lesions and oropharynx clear  RESP: lungs clear to auscultation - no rales, rhonchi or wheezes  MS: no gross musculoskeletal defects noted, no edema  SKIN: there are two erythematous lesions over the right lateral upper thigh. 6x5mm more proximal; 8x7mm distal; well circumscribed, some warmth    Diagnostic Test Results:  none     ASSESSMENT/PLAN:   1. Local reaction to insect sting, accidental or unintentional, initial encounter  2. Rash  No anaphylactic findings. Trial of below with continue topical cares/cleanings. Follow up if not improving.   -  methylPREDNISolone (MEDROL DOSEPAK) 4 MG tablet; Follow package instructions  Dispense: 21 tablet; Refill: 0    3. Need for vaccination  - TDAP, IM (10 - 64 YRS) - Adacel  - ADMIN 1st VACCINE      Jose Luis Vidal PA-C  [facility]

## 2018-08-03 ENCOUNTER — OFFICE VISIT (OUTPATIENT)
Dept: URGENT CARE | Facility: URGENT CARE | Age: 48
End: 2018-08-03
Payer: COMMERCIAL

## 2018-08-03 VITALS
DIASTOLIC BLOOD PRESSURE: 70 MMHG | BODY MASS INDEX: 32.77 KG/M2 | WEIGHT: 203 LBS | OXYGEN SATURATION: 97 % | HEART RATE: 79 BPM | SYSTOLIC BLOOD PRESSURE: 110 MMHG

## 2018-08-03 DIAGNOSIS — M77.12 LATERAL EPICONDYLITIS OF LEFT ELBOW: Primary | ICD-10-CM

## 2018-08-03 PROCEDURE — 99213 OFFICE O/P EST LOW 20 MIN: CPT | Performed by: FAMILY MEDICINE

## 2018-08-03 RX ORDER — NAPROXEN 500 MG/1
500 TABLET ORAL 2 TIMES DAILY PRN
Qty: 30 TABLET | Refills: 3 | Status: SHIPPED | OUTPATIENT
Start: 2018-08-03 | End: 2020-05-18

## 2018-08-03 NOTE — PATIENT INSTRUCTIONS
Follow up with your primary care provider if not better in 10 days.     Understanding Lateral Epicondylitis    Tendons are strong bands of tissue that connect muscles to bones. Lateral epicondylitis affects the tendons that connect muscles in the forearm to the lateral epicondyle. This is the bony knob on the outer side of the elbow. The condition occurs if the extensor tendons of the wrist become red and swollen (irritated). This can cause pain in the elbow, forearm, and wrist. Because the condition is sometimes caused by playing tennis, it is also known as  tennis elbow.   How to say it  LA-tuhr-arian hv-[id number]-IDK-duh-LY-tis   What causes lateral epicondylitis?  The condition most often occurs because of overuse. This can be from any activity that repeatedly puts stress on the forearm extensor muscles or tendons and wrist. For instance, playing tennis, lifting weights, cutting meat, painting, and typing can all cause the condition. Wear and tear of the tendons from aging or an injury to the tendons can also cause the condition.  Symptoms of lateral epicondylitis  The most common symptom is pain. You may feel it on the outer side of the elbow and down the back of the forearm. It may be worse when moving or using the elbow, forearm, or wrist. You may also feel pain when gripping or lifting things.  Treatment for lateral epicondylitis  Treatments may include:    Resting the elbow, forearm, and wrist. You ll need to avoid movements that can make your symptoms worse. You also may need to avoid certain sports and types of work for a time. This helps relieve symptoms and prevent further damage to the tendons.    Changing the action that caused the problem. For instance, if the tendons were damaged from playing tennis, it may help to change your playing technique or use different equipment. This helps prevent further damage to the tendons.    Using cold packs. Putting an ice pack on the injured area can help reduce pain and  swelling.    Taking pain medicines. Taking prescription or over-the-counter pain medicines may help reduce pain and swelling.      Wearing a brace. This helps reduce strain on the muscles and tendons in the forearm, which may relieve symptoms. It is very important to wear the brace properly.    Doing exercises and physical therapy. These help improve strength and range of motion in the elbow, forearm, and wrist.    Getting shots of medicine into the injured area. These may help relieve symptoms for a time.    Having surgery. This may be an option if other treatments fail to relieve symptoms. In many cases, the surgeon removes the damaged tissue.  Possible complications of lateral epicondylitis  If the tendons involved don t heal properly, symptoms may return or get worse. To help prevent this, follow your treatment plan as directed.  When to call your healthcare provider  Call your healthcare provider right away if you have any of these:    Fever of 100.4 F (38 C) or higher, or as directed    Redness, swelling, or warmth in the elbow or forearm that gets worse    Symptoms that don t get better with treatment, or get worse    New symptoms   Date Last Reviewed: 3/10/2016    [id number] [employer]. [address] [place] [address]. All rights reserved. This information is not intended as a substitute for professional medical care. Always follow your healthcare professional's instructions.        Tennis Elbow  Muscles connect to bones by thick, fibrous cords (tendons). When the muscles are overused by repeated motion, the tendons may become inflamed and painful. This condition is called tendonitis.  Tennis elbow (lateral epicondylitis) is a form of tendonitis. It occurs when the forearm muscles are used again and again in a twisting motion. Pain from tennis elbow occurs mainly on the outside of the elbow. But the pain can spread into the forearm and wrist. Your elbow may also be swollen and tender  to the touch.  The pain may get worse when you move your arm or do simple activities. Bending your wrist back, shaking hands, or turning a doorknob may cause pain. The pain often gets worse after several weeks or months. Sometimes you may feel pain when your arm is still.  Tennis players who use a backhand stroke with poor technique are more likely to get tennis elbow. But playing tennis is only one cause of tennis elbow. Other common activities that can cause it include:    Hammering    Painting    Raking  Besides tennis players, people at risk include , gardeners, musicians, and dentists. Sometimes people get tennis elbow without doing anything that would cause the injury.  Treatment includes resting the arm and taking anti-inflammatory medicines. Special splints can help ease symptoms. Symptoms should get better after 4 to 6 weeks of rest. You may need steroid injections if resting and using a splint don t help. After the pain is relieved, you should change your activities so the symptoms don t return. You may need physical therapy. It may include stretching, range-of-motion, and strengthening exercises. These treatments help most cases. You may need surgery if your symptoms continue for 6 months despite treatment.  Home care  Follow these guidelines when caring for yourself at home:    Rest your elbow as needed. Protect it from movement that causes pain. You may be told to use a forearm splint at night to ease symptoms in the morning. Your healthcare provider may recommend a special wrap or splint to compress the muscles of the forearm. This can ease pain during daytime activities. As your symptoms get better, start to move your elbow more.    Put an ice pack on the injured area. Do this for 20 minutes every 1 to 2 hours the first day for pain relief. You can make an ice pack by wrapping a plastic bag of ice cubes in a thin towel. Continue using the ice pack 3 to 4 times a day for the next several  days. Then use the ice pack as needed to ease pain and swelling.    You may use acetaminophen or ibuprofen to control pain, unless another pain medicine was prescribed. If you have chronic liver or kidney disease, talk with your healthcare provider before using these medicines. Also talk with your provider if you ve had a stomach ulcer or gastrointestinal bleeding.    After your elbow heals, avoid the motion that caused your pain. Or learn to move in a way that causes less stress on the tendon. Using a forearm wrap may keep tennis elbow from happening again.    A tennis elbow strap may ease pain and keep you from further injury when you start playing tennis again. You can also lower your risk for injury by warming up before you play and cooling down afterward. You should also use the right equipment. For instance, make sure your racquet has the right  and is the right size for you.  Follow-up care  Follow up with your healthcare provider, or as advised, if your symptoms don t get better after 2 to 3 weeks of treatment.  When to seek medical advice  Call your healthcare provider right away if any of these occur:    Redness over the painful area    Pain, stiffness,  or swelling at the elbow gets worse    Any numbness or tingling in your arm, hands, or fingers    Unexplained fever over 100.4 F (38 C)   Date Last Reviewed: 5/1/2017 2000-2017 [employer]. [address] [place] [address]. All rights reserved. This information is not intended as a substitute for professional medical care. Always follow your healthcare professional's instructions.        Treating Tennis Elbow    Your treatment will depend on how inflamed your tendon is. The goal is to relieve your symptoms and help you regain full use of your elbow.  Rest and medicine  Wearing a tennis elbow splint allows the inflamed tendon to rest. It must be worn properly. It should be placed down the arm past the painful area of the elbow.  If it is directly over the inflamed tendon, it can worsen the symptoms. This brace can help the tendon heal. Using your other hand or changing your  also takes stress off the tendon. Oral nonsteroidal anti-inflammatory medicines (NSAIDs) and ice can relieve pain and reduce swelling.  Exercises and therapy  Your healthcare provider may give you an exercise program. He or she may refer you to a physical therapist. The physical therapist will teach you how to gently stretch and strengthen the muscles around your elbow.  Anti-inflammatory injections  Your healthcare provider may give you injections of an anti-inflammatory medicine, such as cortisone. This helps reduce swelling. You may have more pain at first. But in a few days, your elbow should feel better.  If surgery is needed  If your symptoms persist for a long time, or other treatments don t work, your healthcare provider may recommend surgery. Surgery repairs the inflamed tendon.   Date Last Reviewed: 1/1/2018 2000-2017 [employer]. [address] [place] [address], [place] [address]. All rights reserved. This information is not intended as a substitute for professional medical care. Always follow your healthcare professional's instructions.

## 2018-08-03 NOTE — PROGRESS NOTES
SUBJECTIVE:   Brianna Wall is a 48 year old left-handed female presenting with a chief complaint of left elbow pain (lateral epicondyle area).  Onset of symptoms was two weeks ago.  Course of illness is still present. .  The pain is worse with twisting jar lids, when writing with her left hand, when using the computer mouse, when lifting objects with her left hand.  No obvious trauma.      Current and Associated symptoms: as listed above.   Treatment measures tried include ice, Ibuprofen, Tylenol, rest for the past two weeks without much relief. .  Predisposing factors include Patient uses a mouse when working at a computer, she knits a lot, she types a lot while at work.    Past Medical History:   Diagnosis Date     Arthritis      Carrier of hereditary disease     hemachromatosis carrier     Chronic UTI 1992     Duodenal ulcer 1992     Eczema 1992     Female infertility of unspecified origin 1992    male factor- resolved     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 2012     Multiple nevi      Plantar fasciitis 2009    improved     Rosacea      Urethral stricture 1992     Current Outpatient Prescriptions   Medication Sig Dispense Refill     levonorgestrel (MIRENA) 20 MCG/24HR IUD 1 each by Intrauterine route once       melatonin 5 MG TABS Reported on 2/27/2017       MULTI-VITAMIN OR TABS        vitamin B complex with vitamin C (VITAMIN  B COMPLEX) TABS tablet Take 1 tablet by mouth daily       VITAMIN C 1000 MG OR TABS 3 tabs QD       VITAMIN D PO Reported on 2/27/2017       VITAMIN E 1000 UNIT OR CAPS 1 tab qd       methylPREDNISolone (MEDROL DOSEPAK) 4 MG tablet Follow package instructions (Patient not taking: Reported on 8/3/2018) 21 tablet 0     metroNIDAZOLE 0.75 % LOTN ANG EXT AA D  3     Social History   Substance Use Topics     Smoking status: Never Smoker     Smokeless tobacco: Never Used     Alcohol use Yes      Comment: 1-2 drinks/month       ROS:  Review of systems negative except as stated  above.    OBJECTIVE:  /70 (BP Location: Right arm, Patient Position: Chair, Cuff Size: Adult Large)  Pulse 79  Wt 203 lb (92.1 kg)  SpO2 97%  BMI 32.77 kg/m2  GENERAL APPEARANCE: healthy, alert and no distress  Extremities: Left Elbow:  No ecchymosis/edema.  There is point tenderness at the insertion point of the left epicondyle area.  There is also pain when dorsiflexing the left wrist against resistance.   SKIN: no suspicious lesions or rashes    ASSESSMENT:  Lateral Epicondylitis of the left Elbow    PLAN:  A tennis elbow brace was placed onto the left elbow area.   Rx:  Naproxen  Tylenol  Ice  Rest  follow up with the primary care provider if not better in 10 days.     Benny Gutierrez MD

## 2018-08-03 NOTE — MR AVS SNAPSHOT
After Visit Summary   8/3/2018    Brianna Wall    MRN: [id number]           Patient Information     Date Of Birth          1970        Visit Information        Provider Department      8/3/2018 9:00 AM Benny Gutierrez MD [facility]        Today's Diagnoses     Lateral epicondylitis of left elbow    -  1      Care Instructions      Follow up with your primary care provider if not better in 10 days.     Understanding Lateral Epicondylitis    Tendons are strong bands of tissue that connect muscles to bones. Lateral epicondylitis affects the tendons that connect muscles in the forearm to the lateral epicondyle. This is the bony knob on the outer side of the elbow. The condition occurs if the extensor tendons of the wrist become red and swollen (irritated). This can cause pain in the elbow, forearm, and wrist. Because the condition is sometimes caused by playing tennis, it is also known as  tennis elbow.   How to say it  LA-tuhr-arian nk-te-LRH-duh-LY-tis   What causes lateral epicondylitis?  The condition most often occurs because of overuse. This can be from any activity that repeatedly puts stress on the forearm extensor muscles or tendons and wrist. For instance, playing tennis, lifting weights, cutting meat, painting, and typing can all cause the condition. Wear and tear of the tendons from aging or an injury to the tendons can also cause the condition.  Symptoms of lateral epicondylitis  The most common symptom is pain. You may feel it on the outer side of the elbow and down the back of the forearm. It may be worse when moving or using the elbow, forearm, or wrist. You may also feel pain when gripping or lifting things.  Treatment for lateral epicondylitis  Treatments may include:    Resting the elbow, forearm, and wrist. You ll need to avoid movements that can make your symptoms worse. You also may need to avoid certain sports and types of work for a time. This helps relieve  symptoms and prevent further damage to the tendons.    Changing the action that caused the problem. For instance, if the tendons were damaged from playing tennis, it may help to change your playing technique or use different equipment. This helps prevent further damage to the tendons.    Using cold packs. Putting an ice pack on the injured area can help reduce pain and swelling.    Taking pain medicines. Taking prescription or over-the-counter pain medicines may help reduce pain and swelling.      Wearing a brace. This helps reduce strain on the muscles and tendons in the forearm, which may relieve symptoms. It is very important to wear the brace properly.    Doing exercises and physical therapy. These help improve strength and range of motion in the elbow, forearm, and wrist.    Getting shots of medicine into the injured area. These may help relieve symptoms for a time.    Having surgery. This may be an option if other treatments fail to relieve symptoms. In many cases, the surgeon removes the damaged tissue.  Possible complications of lateral epicondylitis  If the tendons involved don t heal properly, symptoms may return or get worse. To help prevent this, follow your treatment plan as directed.  When to call your healthcare provider  Call your healthcare provider right away if you have any of these:    Fever of 100.4 F (38 C) or higher, or as directed    Redness, swelling, or warmth in the elbow or forearm that gets worse    Symptoms that don t get better with treatment, or get worse    New symptoms   Date Last Reviewed: 3/10/2016    5818-3066 [employer]. [address] [place] [address], [place] [id number]. All rights reserved. This information is not intended as a substitute for professional medical care. Always follow your healthcare professional's instructions.        Tennis Elbow  Muscles connect to bones by thick, fibrous cords (tendons). When the muscles are overused by repeated motion, the tendons may  become inflamed and painful. This condition is called tendonitis.  Tennis elbow (lateral epicondylitis) is a form of tendonitis. It occurs when the forearm muscles are used again and again in a twisting motion. Pain from tennis elbow occurs mainly on the outside of the elbow. But the pain can spread into the forearm and wrist. Your elbow may also be swollen and tender to the touch.  The pain may get worse when you move your arm or do simple activities. Bending your wrist back, shaking hands, or turning a doorknob may cause pain. The pain often gets worse after several weeks or months. Sometimes you may feel pain when your arm is still.  Tennis players who use a backhand stroke with poor technique are more likely to get tennis elbow. But playing tennis is only one cause of tennis elbow. Other common activities that can cause it include:    Hammering    Painting    Raking  Besides tennis players, people at risk include , gardeners, musicians, and dentists. Sometimes people get tennis elbow without doing anything that would cause the injury.  Treatment includes resting the arm and taking anti-inflammatory medicines. Special splints can help ease symptoms. Symptoms should get better after 4 to 6 weeks of rest. You may need steroid injections if resting and using a splint don t help. After the pain is relieved, you should change your activities so the symptoms don t return. You may need physical therapy. It may include stretching, range-of-motion, and strengthening exercises. These treatments help most cases. You may need surgery if your symptoms continue for 6 months despite treatment.  Home care  Follow these guidelines when caring for yourself at home:    Rest your elbow as needed. Protect it from movement that causes pain. You may be told to use a forearm splint at night to ease symptoms in the morning. Your healthcare provider may recommend a special wrap or splint to compress the muscles of the forearm.  This can ease pain during daytime activities. As your symptoms get better, start to move your elbow more.    Put an ice pack on the injured area. Do this for 20 minutes every 1 to 2 hours the first day for pain relief. You can make an ice pack by wrapping a plastic bag of ice cubes in a thin towel. Continue using the ice pack 3 to 4 times a day for the next several days. Then use the ice pack as needed to ease pain and swelling.    You may use acetaminophen or ibuprofen to control pain, unless another pain medicine was prescribed. If you have chronic liver or kidney disease, talk with your healthcare provider before using these medicines. Also talk with your provider if you ve had a stomach ulcer or gastrointestinal bleeding.    After your elbow heals, avoid the motion that caused your pain. Or learn to move in a way that causes less stress on the tendon. Using a forearm wrap may keep tennis elbow from happening again.    A tennis elbow strap may ease pain and keep you from further injury when you start playing tennis again. You can also lower your risk for injury by warming up before you play and cooling down afterward. You should also use the right equipment. For instance, make sure your racquet has the right  and is the right size for you.  Follow-up care  Follow up with your healthcare provider, or as advised, if your symptoms don t get better after 2 to 3 weeks of treatment.  When to seek medical advice  Call your healthcare provider right away if any of these occur:    Redness over the painful area    Pain, stiffness,  or swelling at the elbow gets worse    Any numbness or tingling in your arm, hands, or fingers    Unexplained fever over 100.4 F (38 C)   Date Last Reviewed: 5/1/2017 2000-2017 [employer]. [address] [place] [address]. All rights reserved. This information is not intended as a substitute for professional medical care. Always follow your healthcare  professional's instructions.        Treating Tennis Elbow    Your treatment will depend on how inflamed your tendon is. The goal is to relieve your symptoms and help you regain full use of your elbow.  Rest and medicine  Wearing a tennis elbow splint allows the inflamed tendon to rest. It must be worn properly. It should be placed down the arm past the painful area of the elbow. If it is directly over the inflamed tendon, it can worsen the symptoms. This brace can help the tendon heal. Using your other hand or changing your  also takes stress off the tendon. Oral nonsteroidal anti-inflammatory medicines (NSAIDs) and ice can relieve pain and reduce swelling.  Exercises and therapy  Your healthcare provider may give you an exercise program. He or she may refer you to a physical therapist. The physical therapist will teach you how to gently stretch and strengthen the muscles around your elbow.  Anti-inflammatory injections  Your healthcare provider may give you injections of an anti-inflammatory medicine, such as cortisone. This helps reduce swelling. You may have more pain at first. But in a few days, your elbow should feel better.  If surgery is needed  If your symptoms persist for a long time, or other treatments don t work, your healthcare provider may recommend surgery. Surgery repairs the inflamed tendon.   Date Last Reviewed: 1/1/2018 2000-2017 [employer]. [address] [place] [address], [place] [address]. All rights reserved. This information is not intended as a substitute for professional medical care. Always follow your healthcare professional's instructions.                Follow-ups after your visit        Your next 10 appointments already scheduled     Aug 13, 2018  8:00 AM CDT   [facility]    [address] [place] [id number] [phone]           Please do not eat 10-12 hours before your appointment  if you are coming in fasting for labs on lipids, cholesterol, or glucose (sugar). This does not apply to pregnant women. Water, hot tea and black coffee (with nothing added) are okay. Do not drink other fluids, diet soda or chew gum.            Aug 13, 2018  1:30 PM CDT   PHYSICAL with Kuldip Pritchett MD   [facility] ([facility])    [address] [address] [place] [address] [phone]              Who to contact     If you have questions or need follow up information about today's clinic visit or your schedule please contact [facility] directly at [phone].  Normal or non-critical lab and imaging results will be communicated to you by MyChart, letter or phone within 4 business days after the clinic has received the results. If you do not hear from us within 7 days, please contact the clinic through [employer] or phone. If you have a critical or abnormal lab result, we will notify you by phone as soon as possible.  Submit refill requests through [employer] or call your pharmacy and they will forward the refill request to us. Please allow 3 business days for your refill to be completed.          Additional Information About Your Visit        [employer] Information     [employer] gives you secure access to your electronic health record. If you see a primary care provider, you can also send messages to your care team and make appointments. If you have questions, please call your primary care clinic.  If you do not have a primary care provider, please call [phone] and they will assist you.        Care EveryWhere ID     This is your Care EveryWhere ID. This could be used by other organizations to access your [place] medical records  [id number]        Your Vitals Were     Pulse Pulse Oximetry BMI (Body Mass Index)             79 97% 32.77 kg/m2          Blood Pressure from Last 3 Encounters:   08/03/18 110/70   07/03/18 118/70   12/21/17 108/62    Weight from  Last 3 Encounters:   08/03/18 203 lb (92.1 kg)   07/03/18 203 lb 9.6 oz (92.4 kg)   12/21/17 201 lb (91.2 kg)              Today, you had the following     No orders found for display         Today's Medication Changes          These changes are accurate as of 8/3/18  9:25 AM.  If you have any questions, ask your nurse or doctor.               Start taking these medicines.        Dose/Directions    naproxen 500 MG tablet   Commonly known as:  NAPROSYN   Used for:  Lateral epicondylitis of left elbow   Started by:  Benny Gutierrez MD        Dose:  500 mg   Take 1 tablet (500 mg) by mouth 2 times daily as needed for moderate pain   Quantity:  30 tablet   Refills:  3       order for DME   Used for:  Lateral epicondylitis of left elbow   Started by:  Benny Gutierrez MD        Equipment being ordered: One Tennis Elbow Brace for the Left Elbow.   Quantity:  1 Units   Refills:  0            Where to get your medicines      Some of these will need a paper prescription and others can be bought over the counter.  Ask your nurse if you have questions.     Bring a paper prescription for each of these medications     naproxen 500 MG tablet    order for DME                Primary Care Provider Office Phone # Fax #    Diana Cha, APRN -[phone] [address] [place] [address]        Equal Access to Services     DAMION ESCOTO AH: Hadii caroline patricko Soomaali, waaxda luqadaha, qaybta kaalmada adeegyada, karen barrera haychucky marley. So [facility] [phone].    ATENCIÓN: Si habla español, tiene a vallejo disposición servicios gratuitos de asistencia lingüística. Llame al [phone].    We comply with applicable federal civil rights laws and [place] laws. We do not discriminate on the basis of race, color, national origin, age, disability, sex, sexual orientation, or gender identity.            Thank you!     Thank you for choosing [facility]  for your care. Our goal is always  to provide you with excellent care. Hearing back from our patients is one way we can continue to improve our services. Please take a few minutes to complete the written survey that you may receive in the mail after your visit with us. Thank you!             Your Updated Medication List - Protect others around you: Learn how to safely use, store and throw away your medicines at www.disposemymeds.org.          This list is accurate as of 8/3/18  9:25 AM.  Always use your most recent med list.                   Brand Name Dispense Instructions for use Diagnosis    ascorbic acid 1000 MG Tabs    vitamin C     3 tabs QD        melatonin 5 MG tablet      Reported on 2/27/2017        methylPREDNISolone 4 MG tablet    MEDROL DOSEPAK    21 tablet    Follow package instructions    Local reaction to insect sting, accidental or unintentional, initial encounter, Rash       metroNIDAZOLE 0.75 % Lotn      ANG EXT AA D        MIRENA (52 MG) 20 MCG/24HR IUD   Generic drug:  levonorgestrel      1 each by Intrauterine route once        Multi-vitamin Tabs tablet   Generic drug:  multivitamin, therapeutic with minerals           naproxen 500 MG tablet    NAPROSYN    30 tablet    Take 1 tablet (500 mg) by mouth 2 times daily as needed for moderate pain    Lateral epicondylitis of left elbow       order for DME     1 Units    Equipment being ordered: One Tennis Elbow Brace for the Left Elbow.    Lateral epicondylitis of left elbow       vitamin B complex with vitamin C Tabs tablet      Take 1 tablet by mouth daily        VITAMIN D PO      Reported on 2/27/2017        vitamin E 1000 UNIT capsule    TOCOPHEROL     1 tab qd    Lyme disease

## 2018-08-08 ENCOUNTER — DOCUMENTATION ONLY (OUTPATIENT)
Dept: LAB | Facility: CLINIC | Age: 48
End: 2018-08-08

## 2018-08-08 DIAGNOSIS — Z13.1 SCREENING FOR DIABETES MELLITUS: ICD-10-CM

## 2018-08-08 DIAGNOSIS — Z13.220 LIPID SCREENING: ICD-10-CM

## 2018-08-08 DIAGNOSIS — E55.9 VITAMIN D DEFICIENCY: ICD-10-CM

## 2018-08-08 DIAGNOSIS — Z14.8 HEMOCHROMATOSIS CARRIER: Primary | ICD-10-CM

## 2018-08-08 NOTE — PROGRESS NOTES
Please place or confirm orders for upcoming lab appointment on 08/13/18 .  Thank you.   Patient being seen in the afternoon.  According to  she is due for a lipid panel.  I have futured and pended that lipid order.

## 2018-08-14 DIAGNOSIS — Z13.1 SCREENING FOR DIABETES MELLITUS: ICD-10-CM

## 2018-08-14 DIAGNOSIS — Z14.8 HEMOCHROMATOSIS CARRIER: ICD-10-CM

## 2018-08-14 DIAGNOSIS — E55.9 VITAMIN D DEFICIENCY: ICD-10-CM

## 2018-08-14 DIAGNOSIS — Z13.220 LIPID SCREENING: ICD-10-CM

## 2018-08-14 LAB
CHOLEST SERPL-MCNC: 195 MG/DL
FERRITIN SERPL-MCNC: 92 NG/ML (ref 8–252)
GLUCOSE SERPL-MCNC: 102 MG/DL (ref 70–99)
HDLC SERPL-MCNC: 39 MG/DL
LDLC SERPL CALC-MCNC: 133 MG/DL
NONHDLC SERPL-MCNC: 156 MG/DL
TRIGL SERPL-MCNC: 117 MG/DL

## 2018-08-14 PROCEDURE — 82728 ASSAY OF FERRITIN: CPT | Performed by: OBSTETRICS & GYNECOLOGY

## 2018-08-14 PROCEDURE — 80061 LIPID PANEL: CPT | Performed by: OBSTETRICS & GYNECOLOGY

## 2018-08-14 PROCEDURE — 82306 VITAMIN D 25 HYDROXY: CPT | Performed by: OBSTETRICS & GYNECOLOGY

## 2018-08-14 PROCEDURE — 82947 ASSAY GLUCOSE BLOOD QUANT: CPT | Performed by: OBSTETRICS & GYNECOLOGY

## 2018-08-14 PROCEDURE — 36415 COLL VENOUS BLD VENIPUNCTURE: CPT | Performed by: OBSTETRICS & GYNECOLOGY

## 2018-08-15 PROBLEM — R73.01 IMPAIRED FASTING GLUCOSE: Status: ACTIVE | Noted: 2018-08-15

## 2018-08-15 LAB — DEPRECATED CALCIDIOL+CALCIFEROL SERPL-MC: 35 UG/L (ref 20–75)

## 2018-09-13 ENCOUNTER — TRANSFERRED RECORDS (OUTPATIENT)
Dept: HEALTH INFORMATION MANAGEMENT | Facility: CLINIC | Age: 48
End: 2018-09-13

## 2018-09-17 ENCOUNTER — TRANSFERRED RECORDS (OUTPATIENT)
Dept: HEALTH INFORMATION MANAGEMENT | Facility: CLINIC | Age: 48
End: 2018-09-17

## 2018-11-16 ENCOUNTER — OFFICE VISIT (OUTPATIENT)
Dept: FAMILY MEDICINE | Facility: CLINIC | Age: 48
End: 2018-11-16
Payer: COMMERCIAL

## 2018-11-16 VITALS
TEMPERATURE: 98.3 F | DIASTOLIC BLOOD PRESSURE: 78 MMHG | SYSTOLIC BLOOD PRESSURE: 124 MMHG | HEART RATE: 84 BPM | WEIGHT: 209 LBS | BODY MASS INDEX: 33.73 KG/M2 | OXYGEN SATURATION: 99 %

## 2018-11-16 DIAGNOSIS — M79.631 PAIN IN BOTH FOREARMS: ICD-10-CM

## 2018-11-16 DIAGNOSIS — M79.632 PAIN IN BOTH FOREARMS: ICD-10-CM

## 2018-11-16 DIAGNOSIS — F43.9 SITUATIONAL STRESS: Primary | ICD-10-CM

## 2018-11-16 PROCEDURE — 99214 OFFICE O/P EST MOD 30 MIN: CPT | Performed by: NURSE PRACTITIONER

## 2018-11-16 NOTE — MR AVS SNAPSHOT
After Visit Summary   11/16/2018    Brianna Wall    MRN: [id number]           Patient Information     Date Of Birth          1970        Visit Information        Provider Department      11/16/2018 2:40 PM Diana Cha APRN [facility]        Today's Diagnoses     Situational stress    -  1    Pain in both forearms          Care Instructions    Suggest using computer mouse on leg instead of on the desk, see if it helps.     They will call you to schedule an appointment with a counselor and for the hand therapy    Follow up in 8-10 weeks          Follow-ups after your visit        Additional Services     [facility] PT, HAND, AND CHIROPRACTIC REFERRAL       Physical Therapy, Hand Therapy and Chiropractic Care are available through:  *[facility]  *Hand Therapy (Occupational Therapy or Physical Therapy)  *[facility]    Call one number to schedule at any of the above locations: [phone].    Physical therapy, Hand therapy and/or Chiropractic care has been recommended by your physician as an excellent treatment option to reduce pain and help people return to normal activities, including sports.  Therapy and/or chiropractic care services are a great complement or alternative to expensive and invasive surgery, injections, or long-term use of prescription medications. The primary goal is to identify the underlying problem and provide you the tools to manage your condition on your own.     Please be aware that coverage of these services is subject to the terms and limitations of your health insurance plan.  Call member services at your health plan with any benefit or coverage questions.      Please bring the following to your appointment:  *Your personal calendar for scheduling future appointments  *Comfortable clothing            MENTAL HEALTH REFERRAL  - Adult; Outpatient Treatment; Individual/Couples/Family/Group Therapy/Health  Psychology; FMG: [facility] [phone]; We will contact you to schedule the appointment or please call with any questions       All scheduling is subject to the client's specific insurance plan & benefits, provider/location availability, and provider clinical specialities.  Please arrive 15 minutes early for your first appointment and bring your completed paperwork.    Please be aware that coverage of these services is subject to the terms and limitations of your health insurance plan.  Call member services at your health plan with any benefit or coverage questions.                            Follow-up notes from your care team     Return in about 8 weeks (around 1/11/2019).      Future tests that were ordered for you today     Open Future Orders        Priority Expected Expires Ordered    [facility] PT, HAND, AND CHIROPRACTIC REFERRAL Routine  11/16/2019 11/16/2018            Who to contact     If you have questions or need follow up information about today's clinic visit or your schedule please contact [facility] directly at [phone].  Normal or non-critical lab and imaging results will be communicated to you by AMEChart, letter or phone within 4 business days after the clinic has received the results. If you do not hear from us within 7 days, please contact the clinic through [employer] or phone. If you have a critical or abnormal lab result, we will notify you by phone as soon as possible.  Submit refill requests through [employer] or call your pharmacy and they will forward the refill request to us. Please allow 3 business days for your refill to be completed.          Additional Information About Your Visit        [employer] P's Information     [employer] gives you secure access to your electronic health record. If you see a primary care provider, you can also send messages to your care team and make appointments. If you have questions, please call your primary care clinic.  If you do not have  a primary care provider, please call [phone] and they will assist you.        Care EveryWhere ID     This is your Care EveryWhere ID. This could be used by other organizations to access your [place] medical records  [id number]        Your Vitals Were     Pulse Temperature Pulse Oximetry BMI (Body Mass Index)          84 98.3  F (36.8  C) (Oral) 99% 33.73 kg/m2         Blood Pressure from Last 3 Encounters:   11/16/18 124/78   08/03/18 110/70   07/03/18 118/70    Weight from Last 3 Encounters:   11/16/18 209 lb (94.8 kg)   08/03/18 203 lb (92.1 kg)   07/03/18 203 lb 9.6 oz (92.4 kg)              We Performed the Following     MENTAL HEALTH REFERRAL  - Adult; Outpatient Treatment; Individual/Couples/Family/Group Therapy/Health Psychology; FMG: [facility] [phone]; We will contact you to schedule the appointment or please call with any questions        Primary Care Provider Office Phone # Fax #    YUSUF Mast [phone] [address] [place] [address]        Equal Access to Services     DAMION ESCOTO : Hadii caroline patricko Sotracy, waaxda luqadaha, qaybta kaalmada adehersonyada, karen marley. So [facility] [phone].    ATENCIÓN: Si habla español, tiene a vallejo disposición servicios gratuitos de asistencia lingüística. [facility] [phone].    We comply with applicable federal civil rights laws and [place] laws. We do not discriminate on the basis of race, color, national origin, age, disability, sex, sexual orientation, or gender identity.            Thank you!     Thank you for choosing [facility]  for your care. Our goal is always to provide you with excellent care. Hearing back from our patients is one way we can continue to improve our services. Please take a few minutes to complete the written survey that you may receive in the mail after your visit with us. Thank you!             Your Updated  Medication List - Protect others around you: Learn how to safely use, store and throw away your medicines at www.disposemymeds.org.          This list is accurate as of 11/16/18  3:30 PM.  Always use your most recent med list.                   Brand Name Dispense Instructions for use Diagnosis    ascorbic acid 1000 MG Tabs    vitamin C     3 tabs QD        melatonin 5 MG tablet      Reported on 2/27/2017        MIRENA (52 MG) 20 MCG/24HR IUD   Generic drug:  levonorgestrel      1 each by Intrauterine route once        Multi-vitamin Tabs tablet   Generic drug:  multivitamin, therapeutic with minerals           naproxen 500 MG tablet    NAPROSYN    30 tablet    Take 1 tablet (500 mg) by mouth 2 times daily as needed for moderate pain    Lateral epicondylitis of left elbow       order for DME     1 Units    Equipment being ordered: One Tennis Elbow Brace for the Left Elbow.    Lateral epicondylitis of left elbow       vitamin B complex with vitamin C Tabs tablet      Take 1 tablet by mouth daily        VITAMIN D PO      Reported on 2/27/2017        vitamin E 1000 UNIT capsule    TOCOPHEROL     1 tab qd    Lyme disease

## 2018-11-16 NOTE — PATIENT INSTRUCTIONS
Suggest using computer mouse on leg instead of on the desk, see if it helps.     They will call you to schedule an appointment with a counselor and for the hand therapy    Follow up in 8-10 weeks

## 2018-11-16 NOTE — PROGRESS NOTES
SUBJECTIVE:   Brianna Wall is a 48 year old female who presents to clinic today for the following health issues:    Musculoskeletal problem/pain      Duration: Months    Description  Location: Forearms bilaterally    Intensity:  moderate    Accompanying signs and symptoms: Pain    History  Previous similar problem: no   Previous evaluation:  none    Precipitating or alleviating factors:  Trauma or overuse: YES- Does type a lot, writing, knitting, crotcheting  Aggravating factors include: overuse    Therapies tried and outcome: rest/inactivity, heat, ice, stretching and braces    Patient was seen by Dr. Gutierrez at  on 8/3/2018 for evaluation of left elbow pain. Notes that the pain had been ongoing since the end of June and prior to being seen, she had already tried to manage symptoms with acetaminophen, naproxen, and wearing wrist splints. The elbow pain is improving but now her forearms ache constantly (rates pain as a 3-5/10) and she feels as if the muscles are very tight  She denies any numbness or weakness. Denies any neck pains.     She is on the computer and typing all day at work. It does worsen symptoms. She has not had an extended vacation since the pain started. The pain will improve slightly over a long weekend, but any kind of activity worsens symptoms including building a puzzle and reading books. She shares that quilting and crocheting is very relaxing for her but she has not been able to do so since June which upsets her.     Stress  Patient does not dare take a long vacation as she has been very stressed at work and has been told on an almost weekly basis that they my be laid off. She has considered seeing a counselor to discuss her work stress. Denies depression or acute anxiety    She does not think symptoms are work comp related. Notes that she graduated and received a computer degeee in May and thinks that this may have also contributed to symptoms.     Problem list and histories  reviewed & adjusted, as indicated.  Additional history: as documented    Labs reviewed in EPIC    Reviewed and updated as needed this visit by clinical staff  Tobacco  Allergies  Meds  Problems  Med Hx  Surg Hx  Fam Hx  Soc Hx        Reviewed and updated as needed this visit by Provider  Allergies  Meds  Problems         Patient Active Problem List   Diagnosis     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     Impaired fasting glucose     Past Surgical History:   Procedure Laterality Date     BIOPSY BREAST NEEDLE LOCALIZATION  12/31/2012    Procedure: BIOPSY BREAST NEEDLE LOCALIZATION;  Left Breast Wire Localized Biopsy;  Surgeon: Dinah Lincoln MD;  Location: [facility] OR     BREAST SURGERY  2012    LCIS     C NONSPECIFIC PROCEDURE      tendon transfer  left wrist.     COLONOSCOPY  2012    neg     HC INSERTION INTRAUTERINE DEVICE  2013    Mirena     LASIK  2003       Social History   Substance Use Topics     Smoking status: Never Smoker     Smokeless tobacco: Never Used     Alcohol use Yes      Comment: 1-2 drinks/month     Family History   Problem Relation Age of Onset     Neurologic Disorder Paternal Grandfather      MS     Cerebrovascular Disease Paternal Grandmother      Hypertension Paternal Grandmother      HEART DISEASE Maternal Grandfather      Enlarged heart     Thyroid Disease Maternal Grandfather      Cancer - colorectal Maternal Grandfather      Prostate Cancer Maternal Grandfather      Breast Cancer Mother      Infiltrating lobular breast cancer, Infiltrating ductal breast cancer, BRCA 1&2 neg     Thyroid Disease Mother      Connective Tissue Disorder Mother      RA     Thyroid Disease Maternal Grandmother      Breast Cancer Maternal Grandmother      Blood Disease Father      hemochromatosis     Connective Tissue Disorder Father      gout     Diabetes Father      Autoimmune Disease Father      [id number]     Breast Cancer Maternal Aunt          ROS:  CONSTITUTIONAL: NEGATIVE for fever, chills,  change in weight  ENT/MOUTH: NEGATIVE for ear, mouth and throat problems  RESP: NEGATIVE for significant cough or SOB  CV: NEGATIVE for chest pain, palpitations or peripheral edema  MUSCULOSKELETAL: POSITIVE for bilateral forearm myalgia    This document serves as a record of the services and decisions personally performed and made by YUSUF Mast CNP. It was created on his behalf by Oriana Pruitt, a trained medical scribe. The creation of this document is based on the provider's statements to the medical scribe.  Oriana Pruitt November 16, 2018 3:12 PM    OBJECTIVE:                                                    /78  Pulse 84  Temp 98.3  F (36.8  C) (Oral)  Wt 209 lb (94.8 kg)  SpO2 99%  BMI 33.73 kg/m2  Body mass index is 33.73 kg/(m^2).     GENERAL: healthy, alert and no distress  MS: Mild tenderness of left lateral epicondyle. Minimal tenderness of dorsal forearms bilaterally. Negative finkelstein. No swelling or redness. No decreased ROM  no gross musculoskeletal defects noted  SKIN: no suspicious lesions or rashes  NEURO: Normal strength and tone, mentation intact and speech normal. Hand , biceps and triceps strength were normal     Diagnostic Test Results:  none      ASSESSMENT/PLAN:                                                    A/P:    ICD-10-CM    1. Situational stress F43.9 MENTAL HEALTH REFERRAL  - Adult; Outpatient Treatment; Individual/Couples/Family/Group Therapy/Health Psychology; [facility] [place]: [facility] [phone]; We will contact you to schedule the appointment or please call with any questions   2. Pain in both forearms M79.632 [facility] REFERR[facility]    M79.631      Pain is likely overuse/strain.  No evidence for epicondylitis at this time.  Discussed further reduction of repetitive work and patient will consider options.  Also discussed overall muscle tension and stress as a contributing factor to her current forarm strain.  Hand  therapy and counseling, follow for montoring improvement.    Patient Instructions   Suggest using computer mouse on leg instead of on the desk, see if it helps.     They will call you to schedule an appointment with a counselor and for the hand therapy    Follow up in 8-10 weeks    Highly recommended a vacation as a break from typing and computer work, but patient is very hesitant due to fear of being laid off. Instead, I offered providing a work restriction note. She also declined this.     Recommended hand therapy and using her computer mouse on her leg instead of the desk.     Suggested seeing a counselor to discuss work stress, referral provided.     The information in this document, created by the medical scribe for me, accurately reflects the services I personally performed and the decisions made by me. I have reviewed and approved this document for accuracy prior to leaving the patient care area.  November 16, 2018 3:30 PM    YUSUF Mast [facility]

## 2018-11-30 ENCOUNTER — THERAPY VISIT (OUTPATIENT)
Dept: OCCUPATIONAL THERAPY | Facility: CLINIC | Age: 48
End: 2018-11-30
Attending: NURSE PRACTITIONER
Payer: COMMERCIAL

## 2018-11-30 DIAGNOSIS — M79.631 PAIN IN BOTH FOREARMS: ICD-10-CM

## 2018-11-30 DIAGNOSIS — M79.632 PAIN IN BOTH FOREARMS: ICD-10-CM

## 2018-11-30 PROCEDURE — 97165 OT EVAL LOW COMPLEX 30 MIN: CPT | Mod: GO | Performed by: OCCUPATIONAL THERAPIST

## 2018-11-30 PROCEDURE — 97140 MANUAL THERAPY 1/> REGIONS: CPT | Mod: GO | Performed by: OCCUPATIONAL THERAPIST

## 2018-11-30 PROCEDURE — 97112 NEUROMUSCULAR REEDUCATION: CPT | Mod: GO | Performed by: OCCUPATIONAL THERAPIST

## 2018-11-30 PROCEDURE — 97110 THERAPEUTIC EXERCISES: CPT | Mod: GO | Performed by: OCCUPATIONAL THERAPIST

## 2018-11-30 NOTE — PROGRESS NOTES
Hand Therapy Initial Evaluation  Current Date:  11/30/2018    Subjective:  Brianna Wall is a 48 year old left hand dominant female.    Diagnosis: B forearm pain  DOI:  June 2018 (MD order date 11/30/18)    Patient reports symptoms of pain and stiffness/loss of motion of the B forearms which occurred due to repetitive use. Since onset symptoms are unchanged. Special tests:  none.  Previous treatment: hand therapy [facility] in 2012. General health as reported by patient is good.  Pertinent medical history includes: None.  Medical allergies: Penicillin.  Surgical history: orthopedic: left hand tendon transfer.  Medication history: None.    Occupational Profile Information:  Current occupation is FT /  Currently working in normal job without restrictions  Job Tasks: Computer Work  Prior functional level:  independent-shared household chores  Barriers include:none  Mobility: No difficulty  Transportation: drives  Leisure activities/hobbies: knitting, cross stitch, crocheting, computer use    Upper Extremity Functional Index Score:  SCORE:   Column Totals: /80: 63   (A lower score indicates greater disability.)    Objective:  Pain Level Report: On scale 0-10/10  Date 11/30/2018    side B    Overall 4    At Rest 3    With Activity 6-6.5      Primary Report: location and description  Date 11/30/2018    Side B    Location Extensor wad    Radiation none    Pain Quality Ache    Frequency Constant    Duration Worse in the morning, dependent on use for rest of day    Exacerbated by  Lifting,  Heavy gripping, twisting, pinching, pulling    Relieved by Heat, rest, some stretches    Progression since onset unchanged       Tenderness: Pain level report on scale 0-10/10  Date 11/30/2018 11/30/18   Side R L   Lateral Epicondyle 2 3   PIN 3 2   ECRB Insertion 0 0     Palpation Radial Nerve Path: Pain level report on scale 0-10/10  Date 11/30/2018 11/30/18   Side R L   Supraspinatus 0 0   Triangular  Interval 0 0   Spiral Groove 2 2   Radial Head 0 0   DSRN 0 0     Range of Motion Wrist and Elbow AROM (PROM): WNL    AROM Multi-joint:  Date 11/30/2018 11/30/2018   Side R L   Elbow - FA - Wrist     90 - Neut - Flex 76 75   90 -  Pro  - Flex 78 75 cramping present   Ext - Neut - Flex 72 75   Ext -  Pro  - Flex 65 75     Resisted Testing: MMT on scale 0-5/5, Pain level report on scale 0-10/10  Date 11/30/2018 11/30/18   Side R L   Elbow Ext 5/5, 0/10 5/5, 0/10   Elbow Flex 5/5, 0/10 5/5, 0/10   Sup 5/5, 0/10 5/5, 0/10   Pro 5/5, 0/10 5/5, 0/10   Wrist Ext 5/5, 0/10 5/5, 1/10   Wrist Flex 5/5, 1/10 dorsal forearm 5/5, 2/10 dorsal forearm   Wrist Ext c elbow extended 4/5, 1/10 4/5, 3/10   Middle Finger test 4/5, 0/10 4/5, 2/10     Strength: (Measured in pounds, pain scale 0-10/10)   with Elbow at 90: measured in pounds  Date 11/30/2018        Trials Left Right Left Right Left Right Left Right Left Right Left Right   1 60 70             2               3               Avg               Pain                  with Elbow Extended: measured in pounds  Date 11/30/2018        Trials Left Right Left Right Left Right Left Right Left Right Left Right   1 64 75             2               3               Avg               Pain                 Assessment:  Patient presents with symptoms consistent with diagnosis of B forearm pain, with conservative intervention.     Patient's limitations or Problem List includes:  Pain, Decreased ROM/motion, Weakness, Decreased  and Tightness in musculature of the bilateral elbow and wrist which interferes with the patient's ability to perform Self Care Tasks (dressing, eating, bathing), Work Tasks, Recreational Activities, Household Chores and Driving  as compared to previous level of function.    Rehab Potential:  Excellent - Return to full activity, no limitations    Patient will benefit from skilled Occupational Therapy to increase ROM, flexibility,  strength and forearm  strength and decrease pain to return to previous activity level and resume normal daily tasks and to reach their rehab potential.    Barriers to Learning:  No barrier    Communication Issues:  Patient appears to be able to clearly communicate and understand verbal and written communication and follow directions correctly.    Chart Review: Chart Review, Brief history including review of medical and/or therapy records relating to the presenting problem and Simple history review with patient    Assessment of Occupational Performance:  5 or more Performance Deficits  Identified Performance Deficits: dressing, feeding, hygiene and grooming, driving and community mobility, home establishment and management, meal preparation and cleanup, shopping, work and leisure activities      Clinical Decision Making (Complexity): Low complexity    Treatment Explanation:  The following has been discussed with the patient:    RX ordered/plan of care  Anticipated outcomes  Possible risks and side effects    P: Frequency:  1 X week, once daily  Duration:  for 6 weeks    Treatment Plan:    Modalities:  US   Therapeutic Exercise: AROM of elbow and wrist, PROM with stretch to wrist extensors and flexors,  ECRL strengthening progressing to ECRB strengthening (eccentric progressing to concentric ).   Manual Techniques: Radial head mobilization, interosseous membrane glide, friction massage, Myofascial release of the forearm extensors and flexors  Neuro Re-ed: Active/Passive radial nerve glides  Orthosis:  Wrist cock up orthosis     Home Program:   Exercise: AROM of elbow and wrist, PROM with stretch to wrist extensors and flexors, Active radial nerve glides  MFR to the extensor wad  Activity: Avoid activities that exacerbate pain in the elbow.  Lift with forearms in neutral position.     Discharge Plan:    Achieve all LTG.  Independent in home treatment program.  Reach maximal therapeutic benefit.    Next Visit:  MFR  Passive Radial Nerve  Glides

## 2018-12-10 ENCOUNTER — THERAPY VISIT (OUTPATIENT)
Dept: OCCUPATIONAL THERAPY | Facility: CLINIC | Age: 48
End: 2018-12-10
Payer: COMMERCIAL

## 2018-12-10 DIAGNOSIS — M79.631 PAIN IN BOTH FOREARMS: ICD-10-CM

## 2018-12-10 DIAGNOSIS — M79.632 PAIN IN BOTH FOREARMS: ICD-10-CM

## 2018-12-10 PROCEDURE — 97112 NEUROMUSCULAR REEDUCATION: CPT | Mod: GO | Performed by: OCCUPATIONAL THERAPIST

## 2018-12-10 PROCEDURE — 97140 MANUAL THERAPY 1/> REGIONS: CPT | Mod: GO | Performed by: OCCUPATIONAL THERAPIST

## 2018-12-11 PROBLEM — M79.631 PAIN IN BOTH FOREARMS: Status: ACTIVE | Noted: 2018-12-11

## 2018-12-11 PROBLEM — M79.632 PAIN IN BOTH FOREARMS: Status: ACTIVE | Noted: 2018-12-11

## 2018-12-18 ENCOUNTER — THERAPY VISIT (OUTPATIENT)
Dept: OCCUPATIONAL THERAPY | Facility: CLINIC | Age: 48
End: 2018-12-18
Payer: COMMERCIAL

## 2018-12-18 DIAGNOSIS — M79.632 PAIN IN BOTH FOREARMS: ICD-10-CM

## 2018-12-18 DIAGNOSIS — M79.631 PAIN IN BOTH FOREARMS: ICD-10-CM

## 2018-12-18 PROCEDURE — 97112 NEUROMUSCULAR REEDUCATION: CPT | Mod: GO | Performed by: OCCUPATIONAL THERAPIST

## 2018-12-18 PROCEDURE — 97140 MANUAL THERAPY 1/> REGIONS: CPT | Mod: GO | Performed by: OCCUPATIONAL THERAPIST

## 2018-12-28 ENCOUNTER — THERAPY VISIT (OUTPATIENT)
Dept: OCCUPATIONAL THERAPY | Facility: CLINIC | Age: 48
End: 2018-12-28
Payer: COMMERCIAL

## 2018-12-28 DIAGNOSIS — M79.632 PAIN IN BOTH FOREARMS: ICD-10-CM

## 2018-12-28 DIAGNOSIS — M79.631 PAIN IN BOTH FOREARMS: ICD-10-CM

## 2018-12-28 PROCEDURE — 97140 MANUAL THERAPY 1/> REGIONS: CPT | Mod: GO | Performed by: OCCUPATIONAL THERAPIST

## 2018-12-28 PROCEDURE — 97112 NEUROMUSCULAR REEDUCATION: CPT | Mod: GO | Performed by: OCCUPATIONAL THERAPIST

## 2018-12-31 ENCOUNTER — THERAPY VISIT (OUTPATIENT)
Dept: OCCUPATIONAL THERAPY | Facility: CLINIC | Age: 48
End: 2018-12-31
Payer: COMMERCIAL

## 2018-12-31 DIAGNOSIS — M79.632 PAIN IN BOTH FOREARMS: ICD-10-CM

## 2018-12-31 DIAGNOSIS — M79.631 PAIN IN BOTH FOREARMS: ICD-10-CM

## 2018-12-31 PROCEDURE — 97140 MANUAL THERAPY 1/> REGIONS: CPT | Mod: GO | Performed by: OCCUPATIONAL THERAPIST

## 2018-12-31 PROCEDURE — 97112 NEUROMUSCULAR REEDUCATION: CPT | Mod: GO | Performed by: OCCUPATIONAL THERAPIST

## 2019-01-07 ENCOUNTER — THERAPY VISIT (OUTPATIENT)
Dept: OCCUPATIONAL THERAPY | Facility: CLINIC | Age: 49
End: 2019-01-07
Payer: COMMERCIAL

## 2019-01-07 DIAGNOSIS — M79.631 PAIN IN BOTH FOREARMS: ICD-10-CM

## 2019-01-07 DIAGNOSIS — M79.632 PAIN IN BOTH FOREARMS: ICD-10-CM

## 2019-01-07 PROCEDURE — 97140 MANUAL THERAPY 1/> REGIONS: CPT | Mod: GO | Performed by: OCCUPATIONAL THERAPIST

## 2019-01-07 PROCEDURE — 97112 NEUROMUSCULAR REEDUCATION: CPT | Mod: GO | Performed by: OCCUPATIONAL THERAPIST

## 2019-01-07 NOTE — PROGRESS NOTES
Hand Therapy Progress Note  Current Date:  1/7/19  Reporting Period: 11/30/2018 to 1/7/2019    Subjective:  Brianna Wall is a 48 year old left hand dominant female.    Diagnosis: B forearm pain  DOI:  June 2018 (MD order date 11/30/18)    S:  Subjective changes as noted by patient: Overall great.  At the very extreme motion (extension) it feels like my left elbow needs to crack.  There is occasional stiffness if I have been doing a lot of computer use.   My  has been doing some massage too.  Functional changes noted by patient: see above     Response to previous treatment:  good  Patient has noted adverse reaction to:   None      Objective:  Pain Level Report: On scale 0-10/10  Date 11/30/2018 1/7/19   side B B   Overall 4 0.5   At Rest 3 0   With Activity 6-6.5 2     Primary Report: location and description  Date 11/30/2018 1/7/19   Side B B   Location Extensor wad LEP   Radiation none none   Pain Quality Ache ache   Frequency Constant intermittent   Duration Worse in the morning, dependent on use for rest of day Dependent on use   Exacerbated by  Lifting,  Heavy gripping, twisting, pinching, pulling Knitting, overuse on computer   Relieved by Heat, rest, some stretches Stretches, eccentric strengthening, gripping   Progression since onset unchanged Gradually improving      Tenderness: Pain level report on scale 0-10/10  Date 11/30/2018 11/30/18 1/7/19 1/7/19   Side R L R L   Lateral Epicondyle 2 3 0 1   PIN 3 2 1 1   ECRB Insertion 0 0 NT NT     Palpation Radial Nerve Path: Pain level report on scale 0-10/10  Date 11/30/2018 11/30/18 1/7/19 1/7/19   Side R L R L   Supraspinatus 0 0 NT NT   Triangular Interval 0 0 NT NT   Spiral Groove 2 2 1 1   Radial Head 0 0 NT NT   DSRN 0 0 NT NT     Range of Motion Wrist and Elbow AROM (PROM): WNL    AROM Multi-joint:  Date 11/30/2018 11/30/2018 1/7/19   Side R L R   Elbow - FA - Wrist      90 - Neut - Flex 76 75 NT   90 -  Pro  - Flex 78 75 cramping present  NT   Ext - Neut - Flex 72 75 73   Ext -  Pro  - Flex 65 75 70     Resisted Testing: MMT on scale 0-5/5, Pain level report on scale 0-10/10  Date 11/30/2018 11/30/18 1/7/19 1/7/19   Side R L R L   Elbow Ext 5/5, 0/10 5/5, 0/10 NT NT   Elbow Flex 5/5, 0/10 5/5, 0/10 NT NT   Sup 5/5, 0/10 5/5, 0/10 NT NT   Pro 5/5, 0/10 5/5, 0/10 NT NT   Wrist Ext 5/5, 0/10 5/5, 1/10 NT 5/5, 0/10   Wrist Flex 5/5, 1/10 dorsal forearm 5/5, 2/10 dorsal forearm 5/5, 0/10 5/5, 0/10   Wrist Ext c elbow extended 4/5, 1/10 4/5, 3/10 5/5, 0.5/10 5/5, 2/10   Middle Finger test 4/5, 0/10 4/5, 2/10 5/5, 0/10 5/5, 0/10     Strength: (Measured in pounds, pain scale 0-10/10)   with Elbow at 90: measured in pounds  Date 11/30/2018 1/7/19       Trials Left Right Left Right Left Right Left Right Left Right Left Right   1 60 70 74 80           2               3               Avg               Pain                  with Elbow Extended: measured in pounds  Date 11/30/2018 1/7/19       Trials Left Right Left Right Left Right Left Right Left Right Left Right   1 64 75 72 82           2               3               Avg               Pain                 Assessment:  Response to therapy has been improvement to:  ROM of Wrist:  Flex  Flexibility:  improved excursion of involved muscles and less tightness in involved muscles  Strength:   and pinch  Pain:  frequency is less, intensity of pain is decreased, duration of pain is decreased and less tender over affected area    Overall Assessment:  Patient's symptoms are resolving.  Patient is ready to progress to more complex exercises.  Patient is ready to progress to next level of protocol.  Patient is becoming more independent in home exercise program  Patient would benefit from continued therapy to achieve rehab potential  STG/LTG:  STGoals have been reviewed;  see goal sheet for details and updates.  LTGoals have been reviewed;  see goal sheet for details and updates.    I have re-evaluated this  patient and find that the nature, scope, duration and intensity of the therapy is appropriate for the medical condition of the patient.    P: Frequency:  1 X week, once daily  Duration:  for 2 weeks    Treatment Plan:    Modalities:  US   Therapeutic Exercise: AROM of elbow and wrist, PROM with stretch to wrist extensors and flexors,  ECRL strengthening progressing to ECRB strengthening (eccentric progressing to concentric ).   Manual Techniques: Radial head mobilization, interosseous membrane glide, friction massage, Myofascial release of the forearm extensors and flexors  Neuro Re-ed: Active/Passive radial nerve glides  Orthosis:  Wrist cock up orthosis     Home Program:   Exercise: AROM of elbow and wrist, PROM with stretch to wrist extensors and flexors, Active radial nerve glides  MFR to the extensor wad  Activity: Avoid activities that exacerbate pain in the elbow.  Lift with forearms in neutral position.     Discharge Plan:    Achieve all LTG.  Independent in home treatment program.  Reach maximal therapeutic benefit.    Next Visit:  MFR  Passive Radial Nerve Glides

## 2019-01-11 ENCOUNTER — OFFICE VISIT (OUTPATIENT)
Dept: FAMILY MEDICINE | Facility: CLINIC | Age: 49
End: 2019-01-11
Payer: COMMERCIAL

## 2019-01-11 VITALS
DIASTOLIC BLOOD PRESSURE: 70 MMHG | SYSTOLIC BLOOD PRESSURE: 124 MMHG | OXYGEN SATURATION: 100 % | HEART RATE: 68 BPM | WEIGHT: 204.4 LBS | TEMPERATURE: 98.5 F | BODY MASS INDEX: 32.99 KG/M2

## 2019-01-11 DIAGNOSIS — M79.631 PAIN IN BOTH FOREARMS: Primary | ICD-10-CM

## 2019-01-11 DIAGNOSIS — M79.632 PAIN IN BOTH FOREARMS: Primary | ICD-10-CM

## 2019-01-11 PROCEDURE — 99213 OFFICE O/P EST LOW 20 MIN: CPT | Performed by: NURSE PRACTITIONER

## 2019-01-11 NOTE — PROGRESS NOTES
SUBJECTIVE:   Brianna Wall is a 48 year old female who presents to clinic today for the following health issues:      Follow up to bilateral forearm problem    Duration: 6/2018    Description (location/character/radiation): Bilateral forearm    Intensity:  moderate    Accompanying signs and symptoms: None    History (similar episodes/previous evaluation): None    Precipitating or alleviating factors: None    Therapies tried and outcome: [facility] with improvement    Feels 90% better.  Has one more appt with hand therapy left.  Doing her exercises.    No new symptoms and is very pleased with results.  No new concerns.  Was not able to schedule with Behavioral for counseling.  Did re-print her referral.  Overall feels anxiety is improved as her job stress is decreased currently.       Reviewed and updated as needed this visit by clinical staff  Tobacco  Allergies  Problems  Med Hx  Surg Hx  Fam Hx  Soc Hx      Reviewed and updated as needed this visit by Provider  Allergies  Problems  Med Hx  Surg Hx             Patient Active Problem List   Diagnosis     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     Impaired fasting glucose     Pain in both forearms     Past Surgical History:   Procedure Laterality Date     BIOPSY BREAST NEEDLE LOCALIZATION  12/31/2012    Procedure: BIOPSY BREAST NEEDLE LOCALIZATION;  Left Breast Wire Localized Biopsy;  Surgeon: Dinah Lincoln MD;  Location: [facility] OR     BREAST SURGERY  2012    LCIS     C NONSPECIFIC PROCEDURE      tendon transfer  left wrist.     COLONOSCOPY  2012    neg     HC INSERTION INTRAUTERINE DEVICE  2013    Mirena     LASIK  2003       Social History     Tobacco Use     Smoking status: Never Smoker     Smokeless tobacco: Never Used   Substance Use Topics     Alcohol use: Yes     Comment: 1-2 drinks/month     Family History   Problem Relation Age of Onset     Neurologic Disorder Paternal Grandfather         MS     Cerebrovascular Disease Paternal  Grandmother      Hypertension Paternal Grandmother      Heart Disease Maternal Grandfather         Enlarged heart     Thyroid Disease Maternal Grandfather      Cancer - colorectal Maternal Grandfather      Prostate Cancer Maternal Grandfather      Breast Cancer Mother         Infiltrating lobular breast cancer, Infiltrating ductal breast cancer, BRCA 1&2 neg     Thyroid Disease Mother      Connective Tissue Disorder Mother         RA     Thyroid Disease Maternal Grandmother      Breast Cancer Maternal Grandmother      Blood Disease Father         hemochromatosis     Connective Tissue Disorder Father         gout     Diabetes Father      Autoimmune Disease Father         [id number]     Breast Cancer Maternal Aunt                 ROS:  CV: NEGATIVE for chest pain, palpitations or peripheral edema  C: NEGATIVE for fever, chills, change in weight  E/M: NEGATIVE for ear, mouth and throat problems  R: NEGATIVE for significant cough or SOB    OBJECTIVE:                                                    /70   Pulse 68   Temp 98.5  F (36.9  C) (Oral)   Wt 92.7 kg (204 lb 6.4 oz)   SpO2 100%   BMI 32.99 kg/m   Body mass index is 32.99 kg/m .   GENERAL: healthy, alert, well nourished, well hydrated, no distress  Hand  within normal limits bilaterally.  No tenderness with palpation of elbows.      ASSESSMENT/PLAN:                                                        ICD-10-CM    1. Pain in both forearms M79.632     M79.631        OK to follow Hand therapy instructions for ongoing treatment.  Follow up prn for this issue.  Physical in one year.      There are no Patient Instructions on file for this visit.          YUSUF Mast [facility]

## 2019-01-22 ENCOUNTER — THERAPY VISIT (OUTPATIENT)
Dept: OCCUPATIONAL THERAPY | Facility: CLINIC | Age: 49
End: 2019-01-22
Payer: COMMERCIAL

## 2019-01-22 DIAGNOSIS — M79.632 PAIN IN BOTH FOREARMS: ICD-10-CM

## 2019-01-22 DIAGNOSIS — M79.631 PAIN IN BOTH FOREARMS: ICD-10-CM

## 2019-01-22 PROCEDURE — 97112 NEUROMUSCULAR REEDUCATION: CPT | Mod: GO | Performed by: OCCUPATIONAL THERAPIST

## 2019-01-22 PROCEDURE — 97140 MANUAL THERAPY 1/> REGIONS: CPT | Mod: GO | Performed by: OCCUPATIONAL THERAPIST

## 2019-02-06 PROBLEM — M79.632 PAIN IN BOTH FOREARMS: Status: RESOLVED | Noted: 2018-12-11 | Resolved: 2019-02-06

## 2019-02-06 PROBLEM — M79.631 PAIN IN BOTH FOREARMS: Status: RESOLVED | Noted: 2018-12-11 | Resolved: 2019-02-06

## 2019-02-15 NOTE — PROGRESS NOTES
Pt has not returned for therapy since 1/22/19.  Assume all goals are met to pt satisfaction.  D/C [facility].

## 2019-03-26 ENCOUNTER — OFFICE VISIT (OUTPATIENT)
Dept: FAMILY MEDICINE | Facility: CLINIC | Age: 49
End: 2019-03-26
Payer: COMMERCIAL

## 2019-03-26 VITALS
HEART RATE: 69 BPM | SYSTOLIC BLOOD PRESSURE: 104 MMHG | OXYGEN SATURATION: 99 % | WEIGHT: 198.2 LBS | DIASTOLIC BLOOD PRESSURE: 68 MMHG | RESPIRATION RATE: 16 BRPM | BODY MASS INDEX: 31.85 KG/M2 | TEMPERATURE: 98.7 F | HEIGHT: 66 IN

## 2019-03-26 DIAGNOSIS — M71.30 MYXOID CYST: Primary | ICD-10-CM

## 2019-03-26 PROCEDURE — 99213 OFFICE O/P EST LOW 20 MIN: CPT | Performed by: PHYSICIAN ASSISTANT

## 2019-03-26 ASSESSMENT — MIFFLIN-ST. JEOR: SCORE: 1535.78

## 2019-03-26 NOTE — PROGRESS NOTES
SUBJECTIVE:   Brianna Wall is a 49 year old female who presents to clinic today for the following health issues:    Patient has a bump on third toe in on right foot over the past few months  Bump has grown and is now becoming bothersome.   It is not painful to walk on but slightly tender to touch  No bleeding or discharge  No shoe changes; does not feel any of the shoes are too tight  She has not performed any at home therapies    Problem list and histories reviewed & adjusted, as indicated.  Additional history: as documented    Patient Active Problem List   Diagnosis     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     Impaired fasting glucose     Past Surgical History:   Procedure Laterality Date     BIOPSY BREAST NEEDLE LOCALIZATION  12/31/2012    Procedure: BIOPSY BREAST NEEDLE LOCALIZATION;  Left Breast Wire Localized Biopsy;  Surgeon: Dinah Lincoln MD;  Location: [facility] OR     BREAST SURGERY  2012    LCIS     C NONSPECIFIC PROCEDURE      tendon transfer  left wrist.     COLONOSCOPY  2012    neg     HC INSERTION INTRAUTERINE DEVICE  2013    Mirena     LASIK  2003       Social History     Tobacco Use     Smoking status: Never Smoker     Smokeless tobacco: Never Used   Substance Use Topics     Alcohol use: Yes     Comment: 1-2 drinks/month     Family History   Problem Relation Age of Onset     Neurologic Disorder Paternal Grandfather         MS     Cerebrovascular Disease Paternal Grandmother      Hypertension Paternal Grandmother      Heart Disease Maternal Grandfather         Enlarged heart     Thyroid Disease Maternal Grandfather      Cancer - colorectal Maternal Grandfather      Prostate Cancer Maternal Grandfather      Breast Cancer Mother         Infiltrating lobular breast cancer, Infiltrating ductal breast cancer, BRCA 1&2 neg     Thyroid Disease Mother      Connective Tissue Disorder Mother         RA     Thyroid Disease Maternal Grandmother      Breast Cancer Maternal Grandmother       "Blood Disease Father         hemochromatosis     Connective Tissue Disorder Father         gout     Diabetes Father      Autoimmune Disease Father         [id number]     Breast Cancer Maternal Aunt            Reviewed and updated as needed this visit by clinical staff       Reviewed and updated as needed this visit by Provider         ROS:  Constitutional, HEENT, cardiovascular, pulmonary, gi and gu systems are negative, except as otherwise noted.    OBJECTIVE:     /68   Pulse 69   Temp 98.7  F (37.1  C) (Tympanic)   Resp 16   Ht 1.676 m (5' 6\")   Wt 89.9 kg (198 lb 3.2 oz)   SpO2 99%   BMI 31.99 kg/m    Body mass index is 31.99 kg/m .  GENERAL: healthy, alert and no distress  SKIN: on the dorsal aspect of the third toe is a small clear vesicle. Clear, gelatinous fluid expressed    Diagnostic Test Results:  none     ASSESSMENT/PLAN:   1. Myxoid cyst  Likely from chronic irritation of shoes vs early arthritis to the digit. Easily expressed in clinic. Monitor for infection follow up if recurring    Jose Luis Vidal PA-C  [facility]  "

## 2019-05-06 ENCOUNTER — NURSE TRIAGE (OUTPATIENT)
Dept: NURSING | Facility: CLINIC | Age: 49
End: 2019-05-06

## 2019-05-06 NOTE — TELEPHONE ENCOUNTER
Caller reports she was camping in [place] [facility] this weeken an   Had several deer ticks removed; one embedded and slightly swollen; unsure if she got them all   Triage protocol  Reviewed   Caller is is in parking lot at  Clinic now   Advised to go in and discuss;  Contacted [facility] an advised to refer  Patient to urgent care if  Unable to see today   Barneyfd russell Hewitt RN  FNA       Additional Information    Negative: Fever or severe headache occurs, 2 to 14 days following the bite    Negative: Widespread rash occurs, 2 to 14 days following the bite    Negative: Can't remove live tick (after using Care Advice)    Negative: Can't remove tick's head that was broken off in the skin (after using Care Advice)    Negative: Fever and area is red    Negative: Fever and area is very tender to touch    Negative: Red streak or red line and length > 2 inches (5 cm)    Negative: Red ring or bull's-eye rash occurs around a deer tick bite    Negative: Patient wants to be seen    Probable deer tick that was attached > 36 hours (or tick appears swollen, not flat)    Negative: Tick bite with no complications    Protocols used: TICK BITE-A-OH

## 2019-05-07 ENCOUNTER — OFFICE VISIT (OUTPATIENT)
Dept: FAMILY MEDICINE | Facility: CLINIC | Age: 49
End: 2019-05-07
Payer: COMMERCIAL

## 2019-05-07 VITALS
TEMPERATURE: 98.4 F | RESPIRATION RATE: 16 BRPM | HEART RATE: 77 BPM | WEIGHT: 200 LBS | SYSTOLIC BLOOD PRESSURE: 110 MMHG | OXYGEN SATURATION: 99 % | DIASTOLIC BLOOD PRESSURE: 72 MMHG | BODY MASS INDEX: 31.39 KG/M2 | HEIGHT: 67 IN
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
## 2019-05-07 PROCEDURE — 99213 OFFICE O/P EST LOW 20 MIN: CPT | Performed by: FAMILY MEDICINE

## 2019-05-07 PROCEDURE — 36415 COLL VENOUS BLD VENIPUNCTURE: CPT | Performed by: FAMILY MEDICINE

## 2019-05-07 PROCEDURE — 86618 LYME DISEASE ANTIBODY: CPT | Performed by: FAMILY MEDICINE

## 2019-05-07 ASSESSMENT — MIFFLIN-ST. JEOR: SCORE: 1559.82

## 2019-05-07 NOTE — PATIENT INSTRUCTIONS
Patient Education     Tick Facts    Ticks are small arachnids that feed on the blood of rodents, rabbits, birds, deer, dogs, and humans. Ticks do not fly and do not drop from trees. They climb to the tips of plants along trails and attach themselves to you as you brush against the plant. Ticks may also attach to you if you come in contact with an infested animal.  Avoiding ticks  The following tips will help you avoid ticks:    When walking in tick-infested areas, tuck your pants into your boots or socks, and tuck your shirt into your pants.    Wear light-colored clothing so you can easily see any ticks that get on you.    Apply a tick repellent to pants, socks, and shoes.    Avoid brushing against the plants along trails.    Check yourself and your children at the end of each day when hiking through tick-infested areas. Don't forget to check in your hair as well.  Removing ticks  Removing ticks right away will reduce the chance of disease. Here are some tips for removing ticks:    If possible, have someone else remove the tick from you.    Protect your hands from exposure to the tick by using a tissue or rubber glove.    Ticks have hook-like barbs on their mouth, which they use to attach themselves. Use tweezers or small needle-nose pliers instead of your fingers when removing a tick. Grasp the head as close to the skin as possible. Be careful not to squeeze the body, which would inject more fluid from the tick into your skin.    Pull gently and slowly away from skin until the mouthparts let go. If the tick fails to let go, stop pulling. While holding the head with tweezers, slowly turn it 90 degrees. Then, gently pull away from the skin again. This movement may unlock the tick's jaw and make it easier to remove.    Once you have removed the tick, look closely at the bite area. If you think there are still parts of the tick in your skin that you can't remove, contact your doctor.    Wash your hands and the bite  site with soap and water.  Do not:    Crush or squeeze the tick with the tweezers.    Jerk the tick.    Burn or prick the tick.    Try to suffocate the tick with petroleum jelly or nail polish.  Identifying ticks  Most tick bites are harmless. But some ticks carry diseases, such as Lyme disease and [place] spotted fever. These can spread to people. Lyme disease is of greatest concern. It is carried by only one type of tick, the deer tick. Many public health departments are able to identify a tick to figure out if it is the type that causes Lyme disease. If this service is available in your area, bring the removed tick in a zip-top bag or jar to the public health department for identification. If you cannot identify the tick and if you were bitten in an area of the country where there is a risk of Lyme disease, contact your doctor.  Date Last Reviewed: 9/1/2016 2000-2018 [employer]. [address] [place] [address], [place] [address]. All rights reserved. This information is not intended as a substitute for professional medical care. Always follow your healthcare professional's instructions.

## 2019-05-08 LAB — B BURGDOR IGG+IGM SER QL: 0.02 (ref 0–0.89)

## 2019-08-08 ENCOUNTER — OFFICE VISIT (OUTPATIENT)
Dept: FAMILY MEDICINE | Facility: CLINIC | Age: 49
End: 2019-08-08
Payer: COMMERCIAL

## 2019-08-08 VITALS
DIASTOLIC BLOOD PRESSURE: 70 MMHG | BODY MASS INDEX: 31.01 KG/M2 | OXYGEN SATURATION: 100 % | SYSTOLIC BLOOD PRESSURE: 106 MMHG | HEART RATE: 84 BPM | HEIGHT: 67 IN | RESPIRATION RATE: 18 BRPM | TEMPERATURE: 98.9 F | WEIGHT: 197.6 LBS

## 2019-08-08 DIAGNOSIS — B02.9 HERPES ZOSTER WITHOUT COMPLICATION: Primary | ICD-10-CM

## 2019-08-08 PROCEDURE — 99213 OFFICE O/P EST LOW 20 MIN: CPT | Performed by: PHYSICIAN ASSISTANT

## 2019-08-08 RX ORDER — VALACYCLOVIR HYDROCHLORIDE 1 G/1
1000 TABLET, FILM COATED ORAL 3 TIMES DAILY
Qty: 21 TABLET | Refills: 0 | Status: SHIPPED | OUTPATIENT
Start: 2019-08-08 | End: 2020-02-17

## 2019-08-08 ASSESSMENT — MIFFLIN-ST. JEOR: SCORE: 1548.94

## 2019-08-08 NOTE — PROGRESS NOTES
"Subjective     Brianna Wall is a 49 year old female who presents to clinic today for the following health issues:    HPI   Rash      Duration: 1-2 days     Description  Location: left shoulder   Itching: moderate    Intensity:  moderate    Accompanying signs and symptoms: stinging of rash, blisters     History (similar episodes/previous evaluation): None    Precipitating or alleviating factors:  New exposures:  None  Recent travel: no      Therapies tried and outcome: none    -Patient is a 50yo female who presents with a new rash along the left shoulder  -Initially felt the bumps and then noted some blisters  -the lesions sting/itch more than they are painful  -no n/t into the extremities  -there is no injury/scrape/bite hx that she knows about    Reviewed and updated as needed this visit by Provider         Review of Systems   ROS COMP: Constitutional, HEENT, cardiovascular, pulmonary, gi and gu systems are negative, except as otherwise noted.      Objective    /70   Pulse 84   Temp 98.9  F (37.2  C) (Tympanic)   Resp 18   Ht 1.702 m (5' 7\")   Wt 89.6 kg (197 lb 9.6 oz)   SpO2 100%   BMI 30.95 kg/m    Body mass index is 30.95 kg/m .  Physical Exam   GENERAL: healthy, alert and no distress  SKIN: there is a quarter-sized grouping of vesicular lesions on erythematous base    Diagnostic Test Results:  Labs reviewed in Epic        Assessment & Plan     1. Herpes zoster without complication  Reviewed with patient today. She is doing surprisingly well. Ok to control pain/sxs with tylenol for now and start below. Follow-up if worsening thru mychart and I will send in neurontin and/or pain relief  - valACYclovir (VALTREX) 1000 mg tablet; Take 1 tablet (1,000 mg) by mouth 3 times daily for 7 days  Dispense: 21 tablet; Refill: 0     BMI:   Estimated body mass index is 30.95 kg/m  as calculated from the following:    Height as of this encounter: 1.702 m (5' 7\").    Weight as of this encounter: 89.6 kg " (197 lb 9.6 oz).         Return in about 1 week (around 8/15/2019) for recheck if symptoms are not improving..    Jose Luis Vidal PA-C  [facility]

## 2019-08-12 ENCOUNTER — TRANSFERRED RECORDS (OUTPATIENT)
Dept: HEALTH INFORMATION MANAGEMENT | Facility: CLINIC | Age: 49
End: 2019-08-12

## 2019-08-12 ENCOUNTER — OFFICE VISIT (OUTPATIENT)
Dept: OBGYN | Facility: CLINIC | Age: 49
End: 2019-08-12
Payer: COMMERCIAL

## 2019-08-12 VITALS
SYSTOLIC BLOOD PRESSURE: 128 MMHG | HEART RATE: 84 BPM | BODY MASS INDEX: 31.23 KG/M2 | HEIGHT: 67 IN | DIASTOLIC BLOOD PRESSURE: 74 MMHG | WEIGHT: 199 LBS | OXYGEN SATURATION: 99 %

## 2019-08-12 DIAGNOSIS — D25.1 INTRAMURAL LEIOMYOMA OF UTERUS: ICD-10-CM

## 2019-08-12 DIAGNOSIS — N83.201 CYST OF RIGHT OVARY: ICD-10-CM

## 2019-08-12 DIAGNOSIS — Z13.220 SCREENING CHOLESTEROL LEVEL: ICD-10-CM

## 2019-08-12 DIAGNOSIS — Z14.8 HEMOCHROMATOSIS CARRIER: ICD-10-CM

## 2019-08-12 DIAGNOSIS — Z01.419 ENCOUNTER FOR GYNECOLOGICAL EXAMINATION WITHOUT ABNORMAL FINDING: Primary | ICD-10-CM

## 2019-08-12 DIAGNOSIS — Z13.1 SCREENING FOR DIABETES MELLITUS: ICD-10-CM

## 2019-08-12 PROBLEM — R73.01 IMPAIRED FASTING GLUCOSE: Status: RESOLVED | Noted: 2018-08-15 | Resolved: 2019-08-12

## 2019-08-12 LAB
CHOLEST SERPL-MCNC: 195 MG/DL
FERRITIN SERPL-MCNC: 80 NG/ML (ref 8–252)
GLUCOSE SERPL-MCNC: 96 MG/DL (ref 70–99)
HDLC SERPL-MCNC: 44 MG/DL
IRON SATN MFR SERPL: 26 % (ref 15–46)
IRON SERPL-MCNC: 69 UG/DL (ref 35–180)
LDLC SERPL CALC-MCNC: 123 MG/DL
NONHDLC SERPL-MCNC: 151 MG/DL
TIBC SERPL-MCNC: 268 UG/DL (ref 240–430)
TRIGL SERPL-MCNC: 141 MG/DL

## 2019-08-12 PROCEDURE — 83540 ASSAY OF IRON: CPT | Performed by: OBSTETRICS & GYNECOLOGY

## 2019-08-12 PROCEDURE — 83550 IRON BINDING TEST: CPT | Performed by: OBSTETRICS & GYNECOLOGY

## 2019-08-12 PROCEDURE — 82947 ASSAY GLUCOSE BLOOD QUANT: CPT | Performed by: OBSTETRICS & GYNECOLOGY

## 2019-08-12 PROCEDURE — 36415 COLL VENOUS BLD VENIPUNCTURE: CPT | Performed by: OBSTETRICS & GYNECOLOGY

## 2019-08-12 PROCEDURE — 99396 PREV VISIT EST AGE 40-64: CPT | Performed by: OBSTETRICS & GYNECOLOGY

## 2019-08-12 PROCEDURE — 80061 LIPID PANEL: CPT | Performed by: OBSTETRICS & GYNECOLOGY

## 2019-08-12 PROCEDURE — 82728 ASSAY OF FERRITIN: CPT | Performed by: OBSTETRICS & GYNECOLOGY

## 2019-08-12 ASSESSMENT — MIFFLIN-ST. JEOR: SCORE: 1555.29

## 2019-08-12 NOTE — PATIENT INSTRUCTIONS
If you have any questions regarding your visit, Please contact your care team.     [place] [facility]: [phone]  [facility] CLINIC HOURS TELEPHONE NUMBER       Kuldip Pritchett M.D.        Brigette-    Laurie Samson-Medical Assistant       Monday-[place]  8:00a.m-4:45 p.m  Tuesday-[place]  9:00a.m-4:00 p.m  Wednesday-[place] 8:00a.m-4:45 p.m.  Thursday-[place]  8:00a.m-4:45 p.m.  Friday-[place]  8:00a.m-4:45 p.m. [facility]  [address] [place] [address]  [phone] ask for [facility]  [phone] Fax  Imaging [phone]    [facility]  [address] [place] [address] [place] [address]  [phone]    [facility]  [address] [place] [address]  [phone] ask [facility]  [phone] Fax  Imaging [phone]     Urgent Care locations:    [facility] Monday-Friday  5 pm - 9 pm  Saturday and Sunday   9 am - 5 pm  Monday-Friday   11 am - 9 pm  Saturday and Sunday   9 am - 5 pm   [phone]   If you need a medication refill, please contact your pharmacy. Please allow 3 business days for your refill to be completed.  As always, Thank you for trusting us with your healthcare needs!

## 2019-08-13 ENCOUNTER — TELEPHONE (OUTPATIENT)
Dept: OBGYN | Facility: CLINIC | Age: 49
End: 2019-08-13

## 2019-08-13 DIAGNOSIS — R92.8 ABNORMAL MAMMOGRAM: Primary | ICD-10-CM

## 2019-08-13 NOTE — TELEPHONE ENCOUNTER
Patient has mammogram yesterday and radiologist is recommending left breast ultrasound due to a change in the breast image    They are sending you the report and wanted you to give a fax order or call back  With referral for the procedure  It will be done at [facility]     Alicia fax [phone]

## 2019-08-13 NOTE — PROGRESS NOTES
"Brianna Wall is a 49 year old year old who presents for annual exam. No LMP recorded. (Menstrual status: IUD).    HPI: Doing fairly well.  Menses suppressed with Mirena but this is near expiration.  New job.   Significant interval changes: epicondylitis and shingles better.  Current significant symptoms: occasional hot flashes.  Other problems or concerns: follow-up on iron.   Contraceptive method is IUD.    Past medical, obstetrical, surgical, family and social history reviewed and as noted or updated in chart.     Allergies, meds and supplements are as noted or updated in chart.     ROS:     Systems reviewed include constitutional; breast;                 cardiac; respiratory; gastrointestinal; genitourinary;                                musculoskeletal; integumentary; psychological;                                hematologic/lymphatic and endocrine.                  These systems were negative for significant symptoms except                 for the following additional: none ; see HPI.                                Exam:  VS as noted./74 (BP Location: Right arm, Patient Position: Chair, Cuff Size: Adult Regular)   Pulse 84   Ht 1.702 m (5' 7\")   Wt 90.3 kg (199 lb)   SpO2 99%   Breastfeeding? No   BMI 31.17 kg/m                      Neck, nodes, breasts, abd and pelvis were                             normal or negative except for, or in particular noting, the following                pertinent findings: diffuse physiologic breast granularity without distinct mass, IUD strings in normal position.       Assessment: Gyn exam. Problem List updated.    Plan and Recommendations: Symptoms, problems and concerns reviewed. Health habits and vaccinations reviewed. Calcium/Vitamin D and multi-vitamin supplements instructions given. Breast/skin self-exam awareness. Screening tests including limitations discussed. Follow-up visits discussed. See orders. Mammogram. Colonoscopy recheck. Pap/HRHPV next " yr. Will continue IUD for now, may use additional backup, and return in 6 mo for removal. RTC 1 year.  Will complete biometric screening form and fax as requested.     Brianna was seen today for physical.    Diagnoses and all orders for this visit:    Screening cholesterol level  -     Lipid Profile    Hemochromatosis carrier  -     Ferritin  -     Iron and iron binding capacity    Screening for diabetes mellitus  -     Glucose        Kuldip Pritchett MD

## 2019-08-13 NOTE — TELEPHONE ENCOUNTER
I have placed an order for the diagnostic breast ultrasound. It may be printed out and sent to [facility] for this purpose.   Kuldip Pritchett MD

## 2019-08-19 ENCOUNTER — TRANSFERRED RECORDS (OUTPATIENT)
Dept: HEALTH INFORMATION MANAGEMENT | Facility: CLINIC | Age: 49
End: 2019-08-19

## 2019-10-30 NOTE — PROGRESS NOTES
"  SUBJECTIVE:   Brianna Wall is a 49 year old female who presents to clinic today for the following health issues:    Musculoskeletal problem/pain      Duration: ***    Description  Location: ***    Intensity:  {mild,moderate,severe:856121}    Accompanying signs and symptoms: {OTHER MS SYMPTOMS:034405::\"none\"}    History  Previous similar problem: { :207255}  Previous evaluation:  {PREVIOUS ms evaluation:795748::\"none\"}    Precipitating or alleviating factors:  Trauma or overuse: { :155086}  Aggravating factors include: {AGGRAVATING MS FACTORS CHRONIC PROB:032070::\"none\"}    Therapies tried and outcome: {MS RELIEF ITEMS:264887::\"nothing\"}      {additional problems for provider to add:710696}    Problem list and histories reviewed & adjusted, as indicated.  Additional history: {NONE - AS DOCUMENTED:420361::\"as documented\"}    {HIST REVIEW/ LINKS 2:643407}    Reviewed and updated as needed this visit by clinical staff       Reviewed and updated as needed this visit by Provider         {PROVIDER CHARTING PREFERENCE:582178}  " [facility]

## 2019-11-05 ENCOUNTER — HEALTH MAINTENANCE LETTER (OUTPATIENT)
Age: 49
End: 2019-11-05

## 2020-02-17 ENCOUNTER — OFFICE VISIT (OUTPATIENT)
Dept: OBGYN | Facility: CLINIC | Age: 50
End: 2020-02-17
Payer: COMMERCIAL

## 2020-02-17 VITALS
DIASTOLIC BLOOD PRESSURE: 76 MMHG | WEIGHT: 199 LBS | HEART RATE: 75 BPM | OXYGEN SATURATION: 100 % | BODY MASS INDEX: 31.17 KG/M2 | SYSTOLIC BLOOD PRESSURE: 125 MMHG

## 2020-02-17 DIAGNOSIS — Z30.432 ENCOUNTER FOR IUD REMOVAL: Primary | ICD-10-CM

## 2020-02-17 PROCEDURE — 58301 REMOVE INTRAUTERINE DEVICE: CPT | Performed by: OBSTETRICS & GYNECOLOGY

## 2020-02-17 NOTE — PROGRESS NOTES
OB-GYN Problem-Oriented Visit or Consultation      Brianna Wall is a 49 year old year old P 0 who presents with a chief complaint of  IUD.  Referred by self.  No LMP recorded. (Menstrual status: IUD).    HPI:     See prior notes. Doing well. Desires IUD removal. Probable perimenopause and will observe menses for regularity hereafter to determine if needing a future IUD but will continue with condoms for contraception now. Menses suppressed with Mirena. Some hot flashes. Continues mammogram/MRI follow-up for LCIS.     Past medical, obstetrical, surgical, family and social history reviewed and as noted or updated in chart.     Allergies, meds and supplements are as noted or updated in chart.      ROS:   Systems reviewed include:  constitutional, breast, gastrointestinal, genitourinary, psychological, hematologic/lymphatic and endocrine.    These systems were negative for significant symptoms except for the following additional: none; see HPI.    EXAM:  VS as noted. /76 (BP Location: Right arm, Patient Position: Chair, Cuff Size: Adult Regular)   Pulse 75   Wt 90.3 kg (199 lb)   SpO2 100%   Breastfeeding No   BMI 31.17 kg/m    Constitutionally normal.     PROCEDURE: IUD Removal    I discussed the removal procedure, objectives, risks, complications and future contraceptive methods as indicated.  Patient understood and desired to proceed.    After appropriate preparation, the Mirena IUD was removed intact. No complications. Patient tolerated the procedure well. Stable at discharge. Instructions and information were given.          Assessment:   Encounter Diagnoses   Name Primary?     Encounter for IUD removal Yes         Plan and Recommendations:   Total encounter time (physician together with patient) = 20min. Direct counseling, education and care coordination time (physician together with patient) = 11min. This included the following:   I reviewed the condition, causes, differential  diagnosis, prognosis, evaluation and management considerations and options.  Questions answered and information given. See orders.   As above. Return to clinic in 6 months for annual exam.     A/P:  Brianna was seen today for iud.    Diagnoses and all orders for this visit:    Encounter for IUD removal  -     REMOVE INTRAUTERINE DEVICE        Kuldip Pritchett MD

## 2020-02-17 NOTE — PATIENT INSTRUCTIONS
If you have any questions regarding your visit, Please contact your care team.     [place] [employer] [facility]: [phone]  [facility] CLINIC HOURS TELEPHONE NUMBER       Kuldip Pritchett M.D.      Laurie Samson-Medical Assistant    Maddie-  Thuy-     Monday-[place]  8:00a.m-4:45 p.m  Tuesday-[place]  9:00a.m-4:00 p.m  Wednesday-[place] 8:00a.m-4:45 p.m.  Thursday-[place]  8:00a.m-4:45 p.m.  Friday-[place]  8:00a.m-4:45 p.m. [facility]  [address]  [place], [place] [address] [phone] ask [facility]  [phone] Fax  Imaging [phone]    [facility]  [address]  [place], [place] [address] [phone]    [facility]  [address] [place] [address] [phone] [facility]  [phone] Fax  Imaging [phone]     Urgent Care locations:    [facility] Monday-Friday  5 pm - 9 pm  Saturday and Sunday   9 am - 5 pm  Monday-Friday   11 am - 9 pm  Saturday and Sunday   9 am - 5 pm   [phone]   If you need a medication refill, please contact your pharmacy. Please allow 3 business days for your refill to be completed.  As always, Thank you for trusting us with your healthcare needs!

## 2020-03-06 ENCOUNTER — ALLIED HEALTH/NURSE VISIT (OUTPATIENT)
Dept: NURSING | Facility: CLINIC | Age: 50
End: 2020-03-06
Payer: COMMERCIAL

## 2020-03-06 DIAGNOSIS — Z23 NEED FOR VACCINATION: Primary | ICD-10-CM

## 2020-03-06 PROCEDURE — 99207 ZZC NO CHARGE NURSE ONLY: CPT

## 2020-03-06 PROCEDURE — 90471 IMMUNIZATION ADMIN: CPT

## 2020-03-06 PROCEDURE — 90750 HZV VACC RECOMBINANT IM: CPT

## 2020-03-07 ENCOUNTER — TRANSFERRED RECORDS (OUTPATIENT)
Dept: HEALTH INFORMATION MANAGEMENT | Facility: CLINIC | Age: 50
End: 2020-03-07

## 2020-03-09 ENCOUNTER — TRANSFERRED RECORDS (OUTPATIENT)
Dept: HEALTH INFORMATION MANAGEMENT | Facility: CLINIC | Age: 50
End: 2020-03-09

## 2020-03-10 ENCOUNTER — TELEPHONE (OUTPATIENT)
Dept: FAMILY MEDICINE | Facility: CLINIC | Age: 50
End: 2020-03-10

## 2020-03-10 NOTE — TELEPHONE ENCOUNTER
.Reason for call:  Order   Order or referral being requested: ultrasound  Reason for request: abnormal finding  Date needed: as soon as possible  Has the patient been seen by the PCP for this problem? YES    Additional comments: [facility] would like authorization to do an ultrasound on the patient. Faxed order or call back. FAX:[phone] or call at [phone].    Phone number to reach patient:  Home number on file [phone] (home)    Best Time:  Anytime    Can we leave a detailed message on this number?  YES    Travel screening: Negative

## 2020-03-11 NOTE — TELEPHONE ENCOUNTER
History breast LCIS. I do authorize this testing, breast ultrasound. Please notify [facility] or patient on my behalf. I can sign paper order if needed.   Kuldip Pritchett MD

## 2020-03-11 NOTE — TELEPHONE ENCOUNTER
[facility] called me back and I gave verbal orders per Dr. Pritchett that he did authorize a breast ultrasound.  No form needs to be completed, the verbal authorization is all they need.  Madelyn Daigle [facility]  March 11, 2020 10:25 AM

## 2020-03-11 NOTE — TELEPHONE ENCOUNTER
Left message for [facility] to call me back.  Madelyn Daigle, [facility]  March 11, 2020 8:27 AM

## 2020-03-23 ENCOUNTER — TRANSFERRED RECORDS (OUTPATIENT)
Dept: HEALTH INFORMATION MANAGEMENT | Facility: CLINIC | Age: 50
End: 2020-03-23

## 2020-03-23 NOTE — TELEPHONE ENCOUNTER
[facility] called to inform provider that pt did have image done today 3/23/20 and there was an abnormal finding and pt will need biopsy. Pt wants to have biopsy done this Friday 3/27/20. [facility] would like order faxed to [phone]   Report should be faxed to  in the next 30 min.  Please advise

## 2020-03-26 NOTE — TELEPHONE ENCOUNTER
[facility] called to follow up on biopsy order for right breast. Pt is scheduled to have biopsy tomorrow 3/27/20 at 9:50am, please fax order to [phone]   If questions call [phone]

## 2020-03-26 NOTE — TELEPHONE ENCOUNTER
RN attempted to call [facility] at number provided but it was after hours. Did not leave voicemail at this time.     Anayeli Hayes RN on 3/26/2020 at 4:54 PM

## 2020-03-27 ENCOUNTER — TRANSFERRED RECORDS (OUTPATIENT)
Dept: HEALTH INFORMATION MANAGEMENT | Facility: CLINIC | Age: 50
End: 2020-03-27

## 2020-03-27 ENCOUNTER — TELEPHONE (OUTPATIENT)
Dept: OBGYN | Facility: CLINIC | Age: 50
End: 2020-03-27

## 2020-03-27 NOTE — TELEPHONE ENCOUNTER
RN notes [facility] is notified per Dr. Ferreira as well as Dr. Ventura to go ahead with the US guided right breast biopsy.    RN informed of this per KATTY Perkins who spoke with [facility] directly.    No further action needed.    Haritha Nunez RN on 3/27/2020 at 10:30 AM

## 2020-03-27 NOTE — TELEPHONE ENCOUNTER
RN took call from [facility] saying patient is currently in clinic and they need verbal orders for an US guided biopsy of the right breast due to current precautions.     RN only has orders from Dr. Pritchett for a stereotactic right breast biopsy.     RN routing to provider pool high priority as patient is currently awaiting biopsy at [facility] clinic.    Haritha Nunez RN on 3/27/2020 at 10:18 AM

## 2020-03-27 NOTE — TELEPHONE ENCOUNTER
Please proceed with stereotactic right breast biopsy at [facility].  Kuldip Pritchett MD

## 2020-03-30 NOTE — TELEPHONE ENCOUNTER
Reason for Call:  Other Results    Detailed comments: MARY would like to know if Dr. Pritchett is going to contact Pt regarding results.      Phone Number [facility]  can be reached at: Other phone number:  [phone]    Best Time: before 3 pm today    Can we leave a detailed message on this number? YES    Call taken on 3/30/2020 at 1:06 PM by Germán Levin

## 2020-03-31 ENCOUNTER — TELEPHONE (OUTPATIENT)
Dept: OBGYN | Facility: CLINIC | Age: 50
End: 2020-03-31

## 2020-03-31 DIAGNOSIS — C50.911 MALIGNANT NEOPLASM OF RIGHT FEMALE BREAST, UNSPECIFIED ESTROGEN RECEPTOR STATUS, UNSPECIFIED SITE OF BREAST (H): Primary | ICD-10-CM

## 2020-03-31 NOTE — TELEPHONE ENCOUNTER
[facility] calling [facility] to ask if [facility] results have been given to Dr. Pritchett. She is patiently awaiting. RN did not seen anything under media tab, unsure if Dr. Pritchett has received these results at [facility]. Patient is going to call back to [facility] and make sure results were faxed. RN will route to Dr. Pritchett.     Anayeli Hayes RN on 3/31/2020 at 3:23 PM

## 2020-03-31 NOTE — TELEPHONE ENCOUNTER
I called patient and discussed invasive ductal cancer of right breast found on ultrasound guided breast biopsy at [facility]. Patient understands.     Will arrange Breast Surgery consultation. Discussed referral.   Kuldip Pritchett MD

## 2020-04-01 ENCOUNTER — TELEPHONE (OUTPATIENT)
Dept: MAMMOGRAPHY | Facility: CLINIC | Age: 50
End: 2020-04-01

## 2020-04-01 NOTE — TELEPHONE ENCOUNTER
RN called [facility] and left detailed message on answering machine stating her name and call back number for [facility]. Also relayed that Dr. Kuldip Pritchett had received patient's breast biopsy results and did relay those results to patient as of yesterday.     Anayeli Hayes RN on 4/1/2020 at 9:48 AM

## 2020-04-01 NOTE — TELEPHONE ENCOUNTER
Breast Care Nurse Coordinator:  Call placed to patient today to introduce myself and discuss patient's new breast cancer diagnosis and her upcoming surgical consultation with Dr. Armando Stack.    Patient had right breast core ultrasound needle biopsy at [facility] on 3/27/2020 which shows she has Invasive Ductal Carcinoma, grade 1., Estrogen/Progesterone receptors are positive and HER2(-)negative.      We reviewed the pathology results and I answered all of patient's questions completely and thoroughly to her satisfaction.  Informed patient that I will meet her prior to her visit with Dr. Stack on 4/6/2020 @ 10:15a.m. to give her a new patient folder of information regarding breast cancer and the various community resources available to her and her family.      I gave patient my contact information, as well as Dr. Stack's clinic phone number, and advised she call us with any questions or concerns.  Patient verbalized understanding and agrees with the plan of care.  Catherine Ruffin, RN, BSN

## 2020-04-01 NOTE — TELEPHONE ENCOUNTER
Yarely, Kuldip Jose MD  You; Mg Ob/Gyn Triage 15 hours ago (6:11 PM)      I have called patient and discussed the diagnosis of breast cancer. Please contact the /[facility] in [place] to let them be aware of the referral and see if an initial consult with Dr. Wilner Stack or Dr. Gail Ellis there (see referral order) can be expedited reasonably soon for the patient. Also please contact [facility] and notify them that I have contacted the patient with the report (see other message). I have the faxed copy to be scanned into the chart.     RN called [facility] [place] [phone] and spoke with Annika. She was able to see referral and was told there were imaging and biopsy results from [facility]. Patient is aware of results per Dr. Pritchett' note. Annika will call patient to schedule as soon as able.     Anayeli Hayes RN on 4/1/2020 at 9:55 AM

## 2020-04-06 ENCOUNTER — PRE VISIT (OUTPATIENT)
Dept: ONCOLOGY | Facility: CLINIC | Age: 50
End: 2020-04-06

## 2020-04-06 NOTE — TELEPHONE ENCOUNTER
Due to COVID-19 pandemic, Dr. Armando Stack had a virtual visit with patient this morning, and advised she have medical oncology consultation regarding her newly diagnosed right breast IDC.    Patient is now scheduled to see Dr. Brigette Hewitt at [facility] on 4/8/2020 @ 1p.m.  I gave patient the clinic contact information and informed her that  from the cancer clinic will be reaching out to her within the next 24 hours to confirm appointment details.    I also informed patient that I will be sending patient educational materials regarding breast cancer, along with information on various community resources to her home address.  Notified patient to call me if she has any interest or questions regarding the materials sent to her.  Patient verbalized understanding and agrees with the plan of care.  Catherine Ruffin, RN, BSN

## 2020-04-06 NOTE — TELEPHONE ENCOUNTER
RECORDS STATUS - BREAST    RECORDS REQUESTED FROM: Epic/CE   DATE REQUESTED: 4/6/2020    NOTES DETAILS STATUS   OFFICE NOTE from referring provider Complete   refering Dr. Kludip Pritchett   OFFICE NOTE from medical oncologist N/A    OFFICE NOTE from surgeon Complete 12/31/2012 Left Breast Wire Localized Biopsy with Dr. Lincoln    OFFICE NOTE from radiation oncologist     DISCHARGE SUMMARY from hospital Complete 12/31/2012 Breast Mass, left in [facility]   DISCHARGE REPORT from the ER     OPERATIVE REPORT N/A    MEDICATION LIST Complete [facility]   CLINICAL TRIAL TREATMENTS TO DATE N/A    LABS     PATHOLOGY REPORTS  (Tissue diagnosis, Stage, ER/MA percentage positive and intensity of staining, HER2 IHC, FISH, and all biopsies from breast and any distant metastasis)                 Requested- Bx slides From [facility]   Tracking Number:  [id number]    Internal Bx slides in [facility] Report in Epic    [facility] in [facility]  Breast, left/at 2:00 - 6 cm from nipple, ultrasound-guided core biopsy -    2.  Adjacent breast tissue with lobular neoplasia: Atypical   lobular hyperplasia/lobular carcinoma in-situ (E.-Cadherin negative).         GENONOMIC TESTING     TYPE:   (Next Generation Sequencing, including Foundation One testing, and Oncotype score)     IMAGING (NEED IMAGES & REPORT)     CT SCANS     MRI Complete MRI Breast- [facility] 3/9/2020    MAMMO Complete 8/12/2019 [facility], 9/13/2018   ULTRASOUND Complete US breast -[facility] 3/27/2020   3/23/2020, 8/19/2019   PET     BONE SCAN     BRAIN MRI       Action    Action Taken 4/6/2020  2:14pm     I called pt Brianna - I sent an FAUSTO to her email address: [email or web address] (Update her [facility] records in [place])     [facility] (Ph): [phone]- the phone went to .   I sent a fax request to [facility].     3:21pm Brianna sent back an electronically signed FAUSTO.     4/7/2020 10:58am   I called [facility] - the phone went to  manjinder.     I called [facility] ph: [phone] - they are going to push IMG right over. ([id number] breast images).     I resolved IMG in PACS.

## 2020-04-06 NOTE — TELEPHONE ENCOUNTER
ONCOLOGY INTAKE: Records Information      APPT INFORMATION:  Referring provider:  Dr. Kuldip Pritchett  Referring provider s clinic:  [facility]  Reason for visit/diagnosis:  Consult new Right Breast Cance  Has patient been notified of appointment date and time?: No, referring clinic to notify patient.    RECORDS INFORMATION:  Were the records received with the referral (via [employer])? No    Has patient been seen for any external appt for this diagnosis? No    If yes, where? N/    Has patient had any imaging or procedures outside of [place] for this condition? NO      If Yes, where? N/A    ADDITIONAL INFORMATION:  none

## 2020-04-08 ENCOUNTER — VIRTUAL VISIT (OUTPATIENT)
Dept: ONCOLOGY | Facility: CLINIC | Age: 50
End: 2020-04-08
Attending: OBSTETRICS & GYNECOLOGY
Payer: COMMERCIAL

## 2020-04-08 DIAGNOSIS — C50.911 MALIGNANT NEOPLASM OF RIGHT FEMALE BREAST, UNSPECIFIED ESTROGEN RECEPTOR STATUS, UNSPECIFIED SITE OF BREAST (H): ICD-10-CM

## 2020-04-08 PROCEDURE — 99205 OFFICE O/P NEW HI 60 MIN: CPT | Mod: GT | Performed by: INTERNAL MEDICINE

## 2020-04-08 ASSESSMENT — PAIN SCALES - GENERAL: PAINLEVEL: NO PAIN (0)

## 2020-04-08 NOTE — PROGRESS NOTES
"Brianna Wall is a 50 year old female who is being evaluated via a billable video visit.      The patient has been notified of following:     \"This video visit will be conducted via a call between you and your physician/provider. We have found that certain health care needs can be provided without the need for an in-person physical exam.  This service lets us provide the care you need with a video conversation.  If a prescription is necessary we can send it directly to your pharmacy.  If lab work is needed we can place an order for that and you can then stop by our lab to have the test done at a later time.    Video visits are billed at different rates depending on your insurance coverage.  Please reach out to your insurance provider with any questions.    If during the course of the call the physician/provider feels a video visit is not appropriate, you will not be charged for this service.\"    Patient has given verbal consent for Video visit? Yes    Patient would like the video invitation sent by: Send to e-mail at: [email or web address]    Video Start Time: 12:45pm    Brianna Wall complains of  No chief complaint on file.      I have reviewed and updated the patient's Past Medical History, Social History, Family History and Medication List.    ALLERGIES  Penicillins    Additional provider notes:       Video-Visit Details    Type of service:  Video Visit    Video End Time (time video stopped): 1:30 PM    Originating Location (pt. Location): Home    Distant Location (provider location):  [facility]     Mode of Communication:  Video Conference via Marlene Horan      "

## 2020-04-08 NOTE — PROGRESS NOTES
NEW PATIENT ONCOLOGY CONSULT    VIDEO VISIT    REQUESTING PROVIDER/SERVICE: Armando Chand Breast Surgeon      PATIENT NAME: Brianna Wall   MRN# [id number]     Date of Visit: Apr 8, 2020     YOB: 1970       REASON FOR CONSULTATION/CC:    Right breast central upper quadrant IDC dx 3/2020 at age 50       HISTORY OF ONCOLOGY ILLNESS:    She was diagnosed with left breast LCIS back in 2012, was seen by [place] [facility], recommended screening mammogram plus annual screening breast MRI.  March 2020, screening MRI at [facility] on the breast identified new lobulated enhancement superior central right breast measure about 3.8 cm. No abnormal axilla and mammary chain lymph nodes were identified on MRI.  Subsequent ultrasound identified 12 o'clock position 3 cm from the nipple an irregular hypoechoic solid area measured about 2.8 cm.    March 27, 2020 right breast ultrasound-guided biopsy identified invasive ductal cancer grade 1, negative angiolymphatic invasion, positive grade 2 DCIS, focal atypical lobular hyperplasia, ER AR 99% positive HER-2/hawk negative.    Patient denies any breast complaints in terms of breast lump, skin changes, nipple retraction, nipple discharges, palpable lump in her axilla etc.  Her last MA was 8/2019 and last breast MRI was in 2013 were negative.    She met with breast surgeon Dr. langston, expressed wishes of breast conservation. Due toCovid  19 pandemic and American Society of surgical oncology recommendations, sequence of treatment is changed and discussed to recommendation of starting anti-hormonal therapy in the preop setting.    Today's is the  first times she is in consultation with medical oncologist to discuss her newly diagnosed right breast cancer and neoadjuvant anti-hormonal therapy.      INTERVAL HISTORY  n/a      PAST MEDICAL HISTORY  Arthritis.Plantar fasciitis (2009)   Chronic UTI. Urethral stricture (1992).  Duodenal ulcer (1992),   Left breast LCIS dx  "2012.  IUD for menorrhagia, discontinued 2020.  Multiple nevi, Rosacea,Shingles (),Eczema (),       MEDICATIONS/ALLERY:  Reviewed in Epic system.        SOCIAL HISTORY:    she has never smoked. She has never used smokeless tobacco. She reports current alcohol use. She reports that she does not use drugs.      FAMILY HISTORY:    Blood Disease in her father; Breast Cancer in her maternal aunt, maternal grandmother, and mother; Cancer - colorectal in her maternal grandfather. \"my mother side\" is full of cancers.       REVIEW OF SYSTEMS:   GENERAL: pt is in usual state of health.  No B symptoms  NEURO:   No headache, double vision, or focal weakness.  No neuropathy.   SKIN:  Multiple nevi, Rosacea,Shingles (),Eczema (),   CARDIOVASCULAR:  No High blood pressure or hyperlipidemia. NO GARCIA  PULMONARY:  No shortness of breath, no pleurisy, no cough, no hemoptysis.   GI:  No abdominal pain, nausea, vomiting, constipation.  No diarrhea.  No bright red blood per rectum. Duodenal ulcer (),   :  No urgency, frequency.  No recurrent urinary tract infection. No kidney problems. Chronic UTI. Urethral stricture ().  RHEUMATOLOGY/MUSCULOSKELETAL SYSTEM:  + Arthritis, or muscle pain. No muscle ache. + Plantar fasciitis ().  ENDOCRINE:  No history of diabetes or thyroid problem.  No complaints of hot flashes.   HEMATOLOGY:  No history of bleeding or thrombosis episode.   Oncology: right breast cancer dx 3/2020  IMMUNOLOGY:  No recurrent fever or chills episode.  No recurrent infectious episode.   BREASTS/GYN: Left breast LCIS dx 2012.  IUD for menorrhagia, discontinued 2020.  PSYCHIATRY:  No anxiety or depression.         PHYSICAL EXAMINATION  Pt looks like stated age, very pleasant, not in acute distress.     ECO  Neuro: Oriented to time, person, and places.  ENT, MOUTH: Pupils are equal.  Sclerae are anicteric.  Moist oral mucosa. No oral thrush.   NECK:  No jugular venous " distention.    Respiratory: talk nl, no sob during conversation.  MUSCULOSKELETAL/EXTREMITIES:  No edema.  No cyanotic changes.  No signs of joint deformity. Normal range of motion  SKIN:  No petechiae.  No rash.  No signs of cellulitis.   PSYCHIATRIC: Oriented to time, person, and places. Normal mood and affect. Good memory. Proper insight and judgement.       CURRENT LAB DATA REVIEWED TODAY WITH PATIENT TODAY  March 27, 2020 right breast ultrasound-guided biopsy identified invasive ductal cancer grade 1, negative angiolymphatic invasion, positive grade 2 DCIS, focal atypical lobular hyperplasia, ER ID 99% positive HER-2/hawk negative.        CURRENT IMAGING REVIEWED TODAY WITH PATIENT  March 2020, screening MRI at [facility] on the breast identified new lobulated enhancement superior central right breast measure about 3.8 cm. No abnormal axilla and mammary chain lymph nodes were identified on MRI.  Subsequent ultrasound identified 12 o'clock position 3 cm from the nipple an irregular hypoechoic solid area measured about 2.8 cm.         OLD DATA REVIEWED TODAY WITH SUMMARY WITH PATEINT  MA was 8/2019 and last breast MRI was in 2013 were negative.    Left breast LCIS dx 11/2012.        ASSESSMENT AND PLAN DISCUSSED WITH PATIENT   1.  Clinical T2N0 right upper central quadrant invasive ductal cancer grade 1 ER ID 99% positive HER-2/hawk negative, diagnosed via screening breast MRI.    We discussed the multimodality treatment for early stage breast cancer.  In usual circumstances upfront surgical resection will be offered as the first sequence of events.  Due to Kovic 19 pandemic, it is wise to change to sequence a bit, offer her anti-hormonal therapy in the preop setting.    Patient is informed on the efficacy of anti-hormonal therapy in terms of reduced risk of recurrence,  antitumor effect, and ultimately improve survival.    Patient is informed about the different mechanism of different anti-hormonal therapy approached in  terms of tamoxifen versus AI.  We also discussed the different side effect profile between tamoxifen and AI.    Recommend blood test to see her menopausal status due to her recent use of IUD and her age.    We also discussed the follow-up plan after she is started on anti-hormonal therapy in the preop setting.    We discussed If lumpectomy is to finalize surgical resection approach, adjuvant radiation will also be recommended.    2.  Left breast LCIS diagnosed in 2012.  We discussed the 3 different approaches of managing LCIS.  Due to her recent diagnosis of invasive breast cancer on the right side, and the plan of getting on anti-hormonal therapy approach, in theory the anti-hormonal therapy should prevent LCIS developing into invasive breast cancer.    3.  Extensive family history of cancers on her father's side.  Recommend her to consider genetic counseling, which will be arranged after the pandemic is over..    Patient and her  asked appropriate questions regarding her newly diagnosed invasive cancer,  management issue, the LCIS dx,  the side effect of the interventions, follow-up plan, all answered to their satisfaction.    Total time is 60 minutes, spent in counseling regarding the above.    I have reviewed the note as documented above.  This accurately captures the substance of my video visit with the patient.    Blood hormone levels were done a few days later 4/9/2020, consistent with premenopausal state. So tamoxifen is recommended. Pt is again informed on the plan along with the test result.

## 2020-04-09 ENCOUNTER — TELEPHONE (OUTPATIENT)
Dept: MAMMOGRAPHY | Facility: CLINIC | Age: 50
End: 2020-04-09

## 2020-04-09 DIAGNOSIS — C50.911 MALIGNANT NEOPLASM OF RIGHT FEMALE BREAST, UNSPECIFIED ESTROGEN RECEPTOR STATUS, UNSPECIFIED SITE OF BREAST (H): ICD-10-CM

## 2020-04-09 LAB
ESTRADIOL SERPL-MCNC: 84 PG/ML
FSH SERPL-ACNC: 3 IU/L
LH SERPL-ACNC: 4.2 IU/L

## 2020-04-09 PROCEDURE — 36415 COLL VENOUS BLD VENIPUNCTURE: CPT | Performed by: INTERNAL MEDICINE

## 2020-04-09 PROCEDURE — 82670 ASSAY OF TOTAL ESTRADIOL: CPT | Performed by: INTERNAL MEDICINE

## 2020-04-09 PROCEDURE — 83001 ASSAY OF GONADOTROPIN (FSH): CPT | Performed by: INTERNAL MEDICINE

## 2020-04-09 PROCEDURE — 83002 ASSAY OF GONADOTROPIN (LH): CPT | Performed by: INTERNAL MEDICINE

## 2020-04-09 NOTE — TELEPHONE ENCOUNTER
Breast Care Nurse Coordination;  Follow up call placed to patient today to assess her needs and check in as to whether she has any questions regarding her recent surgical and medical oncology consultations.    Patient was recently diagnosed with right breast cancer and had a virtual surgical consultation with Dr. Stack on 4/6/2020, and a virtual Medical Oncology consultation with Dr. Brigette Hewitt on 4/8/2020.      Patient states the plan is for her to go into her primary care providers office & have her hormone levels checked, which will determine if she has gone through menopause.  This will help Dr. Hewitt in deciding on what AI to place patient on.  I informed patient the future lab orders have been signed by Dr. Hewitt, so patient should just call her clinic to check on Lab Only appointments.  Patient states she will get blood work done today.  Informed patient I will follow up on lab results next Monday(4/13/20) when I'm back in office.     Informed patient to call me back with any other questions or concerns.  Patient verbalized understanding and agrees with the plan of care.  Catherine Ruffin, RN, BSN

## 2020-04-10 ENCOUNTER — TELEPHONE (OUTPATIENT)
Dept: FAMILY MEDICINE | Facility: CLINIC | Age: 50
End: 2020-04-10

## 2020-04-10 RX ORDER — TAMOXIFEN CITRATE 20 MG/1
20 TABLET ORAL DAILY
Qty: 90 TABLET | Refills: 1 | Status: SHIPPED | OUTPATIENT
Start: 2020-04-10 | End: 2021-07-02

## 2020-04-10 NOTE — TELEPHONE ENCOUNTER
Action 4/10/20 2:22 PM - Ellen   Action Taken  Pathology Received from [facility] and sent to be reviewed with filled out pathology form.

## 2020-04-10 NOTE — TELEPHONE ENCOUNTER
I spoke with patient via phone to check in on her with her new diagnosis of breast cancer.  She is coping well and has social support from mother and friend who have managed breast cancer.  Is working on self care including diet and exercise and stress management.  Discussed mood issues.  Overall doing well, with some moments of difficult mood. I offered-- if needed she can follow up for further management of mood issues prn.  Took her first dose of tamoxifen today.

## 2020-04-13 PROCEDURE — 00000346 ZZHCL STATISTIC REVIEW OUTSIDE SLIDES TC 88321: Performed by: INTERNAL MEDICINE

## 2020-04-14 LAB — COPATH REPORT: NORMAL

## 2020-05-08 ENCOUNTER — E-VISIT (OUTPATIENT)
Dept: FAMILY MEDICINE | Facility: CLINIC | Age: 50
End: 2020-05-08
Payer: COMMERCIAL

## 2020-05-08 ENCOUNTER — TELEPHONE (OUTPATIENT)
Dept: SURGERY | Facility: CLINIC | Age: 50
End: 2020-05-08

## 2020-05-08 DIAGNOSIS — R21 RASH: Primary | ICD-10-CM

## 2020-05-08 PROCEDURE — 99207 ZZC NO BILLABLE SERVICE THIS VISIT: CPT | Performed by: NURSE PRACTITIONER

## 2020-05-08 NOTE — TELEPHONE ENCOUNTER
Dr. Stack is requesting a follow-up visit to discuss breast surgery options either in person or a video visit.    Left message for patient to call our office to schedule.

## 2020-05-12 RX ORDER — TRIAMCINOLONE ACETONIDE 1 MG/G
CREAM TOPICAL 2 TIMES DAILY
Qty: 15 G | Refills: 0 | Status: SHIPPED | OUTPATIENT
Start: 2020-05-12 | End: 2020-05-18

## 2020-05-18 ENCOUNTER — OFFICE VISIT (OUTPATIENT)
Dept: SURGERY | Facility: CLINIC | Age: 50
End: 2020-05-18
Payer: COMMERCIAL

## 2020-05-18 VITALS — DIASTOLIC BLOOD PRESSURE: 70 MMHG | SYSTOLIC BLOOD PRESSURE: 124 MMHG

## 2020-05-18 DIAGNOSIS — Z17.0 MALIGNANT NEOPLASM OF CENTRAL PORTION OF RIGHT BREAST IN FEMALE, ESTROGEN RECEPTOR POSITIVE (H): Primary | ICD-10-CM

## 2020-05-18 DIAGNOSIS — C50.111 MALIGNANT NEOPLASM OF CENTRAL PORTION OF RIGHT BREAST IN FEMALE, ESTROGEN RECEPTOR POSITIVE (H): Primary | ICD-10-CM

## 2020-05-18 PROCEDURE — 99214 OFFICE O/P EST MOD 30 MIN: CPT | Performed by: SURGERY

## 2020-05-18 NOTE — PROGRESS NOTES
[facility]  Surgery Consultation    HPI: Patient is a 50 year old female who is here for consultation requested by Diana Cha [phone] for right breast cancer.  The patient had a previous virtual visit so please refer to that note for further details.  She was started on tamoxifen then approximately 1 month ago.  She states the tumor may have shrink but certainly has not grown.  She denies any other new complaints.  She is here for physical exam and further discussion.  Patient denies fevers, chills, nausea, vomiting, SOB, chest pain, abdominal pain.    PMH:  Brianna Wall  has a past medical history of Arthritis, Carrier of hereditary disease, Chronic UTI (1992), Duodenal ulcer (1992), Eczema (1992), Female infertility of unspecified origin (1992), 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 (2012), Malignant neoplasm of right female breast (H) (3/31/2020), Multiple nevi, Plantar fasciitis (2009), Rosacea, Shingles (2019), and Urethral stricture (1992).  PSH:  Brianna Wall  has a past surgical history that includes NONSPECIFIC PROCEDURE; lasik (2003); Breast surgery (2012); Biopsy breast needle localization (12/31/2012); INSERTION INTRAUTERINE DEVICE (2013); colonoscopy (2012); and REMOVE INTRAUTERINE DEVICE (2020).  Social History:  Brianna Wall  reports that she has never smoked. She has never used smokeless tobacco. She reports current alcohol use. She reports that she does not use drugs.  Family History:  Brianna Wall family history includes Autoimmune Disease in her father; Blood Disease in her father; Breast Cancer in her maternal aunt, maternal grandmother, and mother; Cancer - colorectal in her maternal grandfather; Cerebrovascular Disease in her paternal grandmother; Connective Tissue Disorder in her father and mother; Diabetes in her father; Heart Disease in her maternal grandfather; Hypertension in her paternal grandmother; Neurologic  Disorder in her paternal grandfather; Prostate Cancer in her maternal grandfather; Thyroid Disease in her maternal grandfather, maternal grandmother, and mother.  Medications/Allergies: Home medications and allergies reviewed.    ROS:  The 12 point Review of Systems is negative other than noted in the HPI.    Physical Exam:  /70   GENERAL: Generally appears well.  Psych: Alert and Oriented.  Normal affect  Eyes: Sclera clear  Respiratory:  Lungs with good air excursion  Cardiovascular:  Normal peripheral pulses  Breast: A bilateral breast exam was performed in the sitting and supine position. The hands were placed at this side and above the head. Bilateral breasts were palpated in a circumferential clockwise fashion including the supraclavicular and axillary areas. Right breast-mass palpated at 12 o'clock position.  Measures on exam to be at least 4 cm in length and larger than an MRI findings.  No palpable adenopathy.  No skin involvement.  Freely mobile.  O clock position.. Left breast-no masses palpated.  No axillary adenopathy.  GI: Abdomen Soft Non-Tender   Lymphatic/Hematologic/Immune:  No cervical lymphadenopathy.  Integumentary:  No rashes  Neurological: grossly intact  Chaperoned by Catherine LOW    All new lab and imaging data was reviewed.     Impression and Plan:  Patient is a 50 year old female with right breast cancer    PLAN:   We once again discussed the pathophysiology of right breast cancer including further work-up and management.  She has been placed on tamoxifen and states there may have been some decrease in tumor size.  The tumor actually feels bigger to me than what is seen on MRI and appears to occupy a significant portion of her upper breast.  We discussed further work-up and management which could include #1- going forward with right mastectomy and sentinel lymph node biopsy with immediate construction now, #2- continuing tamoxifen therapy with follow-up in 1 to 2 months to see if the  tumor has decreased in size, #3 going forward with bilateral mastectomy given her LCIS.  I do not feel this would be amenable to a lumpectomy at this time and would recommend going forward with either mastectomy now or continuing hormone therapy.  The patient is in favor of continuing hormone therapy with interval follow-up to see if she is a candidate for breast conservation.  She has a follow-up with Dr. ALONSO in the beginning of June and we could consider ordering imaging at that time to assess her tumor.  I would otherwise see her in approximately 2 months to see if she would be a contact candidate for lumpectomy at that time.  I advised the patient to call us immediately if she noticed that the tumor is enlarging or having any other side effects.  She agreed.      Thank you very much for this consult.    Armando Stack M.D.  [facility]  [phone]    Please route or send letter to:  Primary Care Provider (PCP) and Referring Provider

## 2020-05-18 NOTE — NURSING NOTE
Breast Nurse Care Coordination:  I introduced self to patient, and explained my role of breast nurse coordinator. I accompanied Brianna to her follow up surgical consultation on 5/18/20 with Dr. Armando Stack at the [facility].      Brianna was given a folder of educational materials regarding sentinel lymph node biopsy, and options for breast surgeries, along with exercises after breast surgery.   I also enclosed a copy of her right breast biopsy pathology report(03/27/2020- [facility]).       At the end of the consultation, we reviewed Brianna's plan of care and education.  The plan is for patient to follow up with her medical oncologist- Dr. Brigette Hewitt on 6/3/2020, and return for follow up visit with Dr. Stack in 1-2 months.     I answered her questions and encouraged patient to call me back with any future questions or concerns.  Brianna has my contact information, and knows to contact me in the future with any questions or concerns.     Catherine Ruffin, RN, BSN  Breast Care Nurse Coordinator  [facility] [place] [place] [facility] [place]  [phone]

## 2020-05-18 NOTE — LETTER
[facility]    [address], [address]  [place], [place] [address]  Phone [phone]  Fax [phone] [address], [address]  [facility] [place] [address]  Phone [phone]  Fax [phone]    [email or web address]     May 18, 2020    Re: Brianna Wall  : 1970      [facility]  Surgery Consultation     HPI: Patient is a 50 year old female who is here for consultation requested by Diana Cha [phone] for right breast cancer.  The patient had a previous virtual visit so please refer to that note for further details.  She was started on tamoxifen then approximately 1 month ago.  She states the tumor may have shrink but certainly has not grown.  She denies any other new complaints.  She is here for physical exam and further discussion.  Patient denies fevers, chills, nausea, vomiting, SOB, chest pain, abdominal pain.     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  PSH:  Brianna Wall  has a past surgical history that includes NONSPECIFIC PROCEDURE; lasik (); Breast surgery (); Biopsy breast needle localization (2012); INSERTION INTRAUTERINE DEVICE (); colonoscopy (); and REMOVE INTRAUTERINE DEVICE ().  Social History:  Brianna Wall  reports that she has never smoked. She has never used smokeless tobacco. She reports current alcohol use. She reports that she does not use drugs.  Family History:  Brianna Wall family history includes Autoimmune Disease in her father; Blood Disease  in her father; Breast Cancer in her maternal aunt, maternal grandmother, and mother; Cancer - colorectal in her maternal grandfather; Cerebrovascular Disease in her paternal grandmother; Connective Tissue Disorder in her father and mother; Diabetes in her father; Heart Disease in her maternal grandfather; Hypertension in her paternal grandmother; Neurologic Disorder in her paternal grandfather; Prostate Cancer in her maternal grandfather; Thyroid Disease in her maternal grandfather, maternal grandmother, and mother.  Medications/Allergies: Home medications and allergies reviewed.     ROS:  The 12 point Review of Systems is negative other than noted in the HPI.     Physical Exam:  /70   GENERAL: Generally appears well.  Psych: Alert and Oriented.  Normal affect  Eyes: Sclera clear  Respiratory:  Lungs with good air excursion  Cardiovascular:  Normal peripheral pulses  Breast: A bilateral breast exam was performed in the sitting and supine position. The hands were placed at this side and above the head. Bilateral breasts were palpated in a circumferential clockwise fashion including the supraclavicular and axillary areas. Right breast-mass palpated at 12 o'clock position.  Measures on exam to be at least 4 cm in length and larger than an MRI findings.  No palpable adenopathy.  No skin involvement.  Freely mobile.  O clock position.. Left breast-no masses palpated.  No axillary adenopathy.  GI: Abdomen Soft Non-Tender   Lymphatic/Hematologic/Immune:  No cervical lymphadenopathy.  Integumentary:  No rashes  Neurological: grossly intact  Chaperoned by Catherine LOW     All new lab and imaging data was reviewed.      Impression and Plan:  Patient is a 50 year old female with right breast cancer     PLAN:   We once again discussed the pathophysiology of right breast cancer including further work-up and management.  She has been placed on tamoxifen and states there may have been some decrease in tumor size.  The tumor  actually feels bigger to me than what is seen on MRI and appears to occupy a significant portion of her upper breast.  We discussed further work-up and management which could include #1- going forward with right mastectomy and sentinel lymph node biopsy with immediate construction now, #2- continuing tamoxifen therapy with follow-up in 1 to 2 months to see if the tumor has decreased in size, #3 going forward with bilateral mastectomy given her LCIS.  I do not feel this would be amenable to a lumpectomy at this time and would recommend going forward with either mastectomy now or continuing hormone therapy.  The patient is in favor of continuing hormone therapy with interval follow-up to see if she is a candidate for breast conservation.  She has a follow-up with Dr. ALONSO in the beginning of June and we could consider ordering imaging at that time to assess her tumor.  I would otherwise see her in approximately 2 months to see if she would be a contact candidate for lumpectomy at that time.  I advised the patient to call us immediately if she noticed that the tumor is enlarging or having any other side effects.  She agreed.        Thank you very much for this consult.     Armando Stack M.D.  [facility]  [phone]

## 2020-05-29 ENCOUNTER — TELEPHONE (OUTPATIENT)
Dept: ONCOLOGY | Facility: CLINIC | Age: 50
End: 2020-05-29

## 2020-05-29 NOTE — TELEPHONE ENCOUNTER
Pt has an appt with Dr Hewitt 6/3 wants to know if she should have imaging done before 6/3. Wants to know how Dr Hewitt would know if her treatment has been effective without imaging.   Inquiry forwarded to Dr Hewitt to follow up with pt.   My Mcguire RN

## 2020-06-01 NOTE — TELEPHONE ENCOUNTER
Patient calling for an update on Dr. Hewitt's recommendations. Writer informed her Dr. Hewitt would like to examine her first then decide. Patient agrees with plan.     Ellie Layne, TRINYN, RN, PHN

## 2020-06-02 ENCOUNTER — TELEPHONE (OUTPATIENT)
Dept: ONCOLOGY | Facility: CLINIC | Age: 50
End: 2020-06-02

## 2020-06-02 NOTE — TELEPHONE ENCOUNTER
"Patient is currently scheduled for an appointment at [facility] in [place].  Called patient to review current visitor restrictions and complete COVID-19 Patient Infection/Travel Screening Tool.     Due to the recent public health concerns around COVID-19 and in an effort to keep our patients and staff safe and healthy, we are implementing a screening process for the patients that come to our clinic.      I am going to ask you a few questions, please answer yes or no.  Your honesty about any symptoms is critical, as it keeps patients and staff healthy.      Do you have a:  Fever (or reported chills)?  No  New cough (started within the past 14 days)?  No  New shortness of breath (started within the past 14 days)?  No  Rash?  No    In the last month, have you been in contact with someone who was confirmed or suspected to have Coronavirus/COVID-19?  No    Have you traveled internationally in the last month?  No  If so, where?  N/A     I also wanted to let you know that to protect our patients from the flu and other common illnesses, [facility] enforce visitor restrictions year round, but due to the community spread of COVID-19 in [place], we are taking additional precautionary steps to ensure the health of our patients.  At this time, NO visitors are allowed on our hospital and clinic campuses.     Patient PASSED the screening assessment.    Patient instructed to come to the clinic as planned for their scheduled appointment and to call the clinic if any symptoms develop prior to their appointment.    \"Per CDC Guidelines, we are asking all patients that are coming into the building to wear a cloth covering that covers your mouth and nose.  You will be screened again at the entrance to the clinic for any Covid 19 symptoms. If you screen positive to any Covid 19 symptoms during our screening process you will be provided a surgical mask to wear during your time in the " "building.\"    \"COVID-19 is contagious and can be dangerous for our patients and staff.  Please send us a En[employer] message or call our clinic before coming in if you feel any of the following symptoms: fever, cough, congestion, runny nose, sore throat, muscle aches and pains, or shortness of breath.  If you are already at our clinic, it is very important that you be honest about any symptoms you are experiencing to ensure your safety and that of other patients and staff who treat you.  If you do have symptoms, we will have a nurse and/or provider asses you to determine next steps.\"    Keisha Hand, CMA on 6/2/2020 at 2:32 PM    "

## 2020-06-03 ENCOUNTER — ONCOLOGY VISIT (OUTPATIENT)
Dept: ONCOLOGY | Facility: CLINIC | Age: 50
End: 2020-06-03
Attending: INTERNAL MEDICINE
Payer: COMMERCIAL

## 2020-06-03 VITALS
TEMPERATURE: 98.1 F | SYSTOLIC BLOOD PRESSURE: 123 MMHG | HEART RATE: 83 BPM | BODY MASS INDEX: 30.89 KG/M2 | WEIGHT: 196.8 LBS | RESPIRATION RATE: 18 BRPM | DIASTOLIC BLOOD PRESSURE: 80 MMHG | OXYGEN SATURATION: 99 % | HEIGHT: 67 IN

## 2020-06-03 DIAGNOSIS — D05.02 NEOPLASM OF LEFT BREAST, PRIMARY TUMOR STAGING CATEGORY TIS: LOBULAR CARCINOMA IN SITU (LCIS): ICD-10-CM

## 2020-06-03 DIAGNOSIS — Z80.9 FAMILY HISTORY OF CANCER: ICD-10-CM

## 2020-06-03 DIAGNOSIS — Z17.0 MALIGNANT NEOPLASM OF CENTRAL PORTION OF RIGHT BREAST IN FEMALE, ESTROGEN RECEPTOR POSITIVE (H): Primary | ICD-10-CM

## 2020-06-03 DIAGNOSIS — C50.111 MALIGNANT NEOPLASM OF CENTRAL PORTION OF RIGHT BREAST IN FEMALE, ESTROGEN RECEPTOR POSITIVE (H): Primary | ICD-10-CM

## 2020-06-03 PROCEDURE — G0463 HOSPITAL OUTPT CLINIC VISIT: HCPCS

## 2020-06-03 PROCEDURE — 99215 OFFICE O/P EST HI 40 MIN: CPT | Performed by: INTERNAL MEDICINE

## 2020-06-03 RX ORDER — ASPIRIN 81 MG/1
81 TABLET ORAL DAILY
COMMUNITY
End: 2020-11-18

## 2020-06-03 ASSESSMENT — PAIN SCALES - GENERAL: PAINLEVEL: NO PAIN (0)

## 2020-06-03 ASSESSMENT — MIFFLIN-ST. JEOR: SCORE: 1540.31

## 2020-06-03 NOTE — LETTER
6/3/2020         RE: Brianna Wall  [address]  [facility] [address]        Dear Colleague,    Thank you for referring your patient, Brianna Wall, to the [facility]. Please see a copy of my visit note below.    ONCOLOGY FOLLOW UP VISIT        PATIENT NAME: Brianna Wall   MRN# [id number]     Date of Visit: Vladislav 3, 2020     YOB: 1970       REASON FOR VISIT/CC:    Right breast central upper quadrant IDC dx 3/2020 at age 50 on neoadjuvant anti hormone therapy due to Covid.       HISTORY OF ONCOLOGY ILLNESS:    She was diagnosed with left breast LCIS back in 2012, was seen by [place] [facility], recommended screening mammogram plus annual screening breast MRI.  March 2020, screening MRI at [facility] on the right breast identified new lobulated enhancement superior central right breast measure about 3.8 cm. No abnormal axilla and mammary chain lymph nodes were identified on MRI.  Subsequent ultrasound identified 12 o'clock position 3 cm from the nipple an irregular hypoechoic solid area measured about 2.8 cm.    March 27, 2020 right breast ultrasound-guided biopsy identified invasive ductal cancer grade 1, negative angiolymphatic invasion, positive grade 2 DCIS, focal atypical lobular hyperplasia, ER PA 99% positive HER-2/hawk negative.    Patient denies any breast complaints in terms of breast lump, skin changes, nipple retraction, nipple discharges, palpable lump in her axilla etc.  Her last MA was 8/2019 and last breast MRI was in 2013 were negative.    She met with breast surgeon Dr. Stack,  expressed wishes of breast conservation. Due toCovid  19 pandemic and American Society of surgical oncology recommendations, sequence of treatment is changed and discussed to recommendation of starting anti-hormonal therapy in the preop setting.      INTERVAL HISTORY  She made informed decision to proceed with neoadjuvant anti hormone therapy in April with  "tamoxifen due to premenopausal status.       PAST MEDICAL HISTORY  Arthritis.Plantar fasciitis ()   Chronic UTI. Urethral stricture ().  Duodenal ulcer (),   Left breast LCIS dx 2012.  IUD for menorrhagia, discontinued 2020.  Multiple nevi, Rosacea,Shingles (),Eczema (),       MEDICATIONS/ALLERY:  Reviewed in Epic system.        SOCIAL HISTORY:    she has never smoked. She has never used smokeless tobacco. She reports current alcohol use. She reports that she does not use drugs.      FAMILY HISTORY:    Blood Disease in her father; Breast Cancer in her maternal aunt, maternal grandmother, and mother; Cancer - colorectal in her maternal grandfather. \"my mother side\" is full of cancers.       REVIEW OF SYSTEMS:   Hot flushes some, one episode of yeast infection before.      PHYSICAL EXAMINATION:   VITAL SIGNS:  Blood pressure 123/80, pulse 83, temperature 98.1  F (36.7  C), temperature source Oral, resp. rate 18, height 1.702 m (5' 7\"), weight 89.3 kg (196 lb 12.8 oz), SpO2 99 %, not currently breastfeeding.      GENERAL APPEARANCE:  looks like her stated age, very pleasant, not in acute distress.     ECO    ENT, MOUTH: Pupils are equally reactive to light.  Sclerae are anicteric.  Moist oral mucosa without lesion or ulcer.   Negative pharynx.  No oral thrush.   NECK:  Supple.  No jugular venous distention.  Thyroid is not palpable.   LYMPH NODES:  Superficial lymphadenopathy is not appreciable in the bilateral cervical, supraclavicular, axillary or inguinal areas.   CARDIOVASCULAR:  S1, S2 regular with no murmurs or gallops.  No carotid or abdominal bruits.   PULMONARY:  Lungs are clear to auscultation and percussion bilaterally.  There is no wheezing or rhonchi.   GASTROINTESTINAL:  Abdomen is soft, nontender.  No hepatosplenomegaly.  No signs of ascites.  No mass appreciable.   MUSCULOSKELETAL/EXTREMITIES:  No edema.  No cyanotic changes.  No signs of joint deformity.  No " lymphedema.   NEUROLOGIC:  Cranial nerves II-XII are grossly intact.  Sensation intact.  Muscle strength and muscle tone symmetrical, 5/5 throughout.   BACK  No spinal or paraspinal tenderness.  No CVA tenderness.   SKIN:  No petechiae.  No rash.  No signs of cellulitis.   BREASTS/CHEST/AXILLA:  exam revealed large central right breast mass feels like 3-5 cm to me. No nipple or skin changes.   No palpable axilla mass or LN.  left breast exam is negative  PSYCHIATRIC: Oriented to time, person, and places. Normal mood and affect. Good memory. Proper insight and judgement.         CURRENT LAB DATA REVIEWED TODAY WITH PATIENT TODAY  4/2020 hormone level is most consistent with premenopausal state.       March 27, 2020 right breast ultrasound-guided biopsy identified invasive ductal cancer grade 1, negative angiolymphatic invasion, positive grade 2 DCIS, focal atypical lobular hyperplasia, ER CO 99% positive HER-2/hawk negative.        CURRENT IMAGING REVIEWED TODAY WITH PATIENT  March 2020, screening MRI at [facility] on the breast identified new lobulated enhancement superior central right breast measure about 3.8 cm. No abnormal axilla and mammary chain lymph nodes were identified on MRI.  Subsequent ultrasound identified 12 o'clock position 3 cm from the nipple an irregular hypoechoic solid area measured about 2.8 cm.         OLD DATA REVIEWED TODAY WITH SUMMARY WITH PATEINT  MA was 8/2019 and last breast MRI was in 2013 were negative.    Left breast LCIS dx 11/2012.        ASSESSMENT AND PLAN DISCUSSED WITH PATIENT TODAY:  1.  Clinical T2N0 right upper central quadrant invasive ductal cancer grade 1 ER CO 99% positive HER-2/hawk negative, diagnosed via screening breast MRI.    She made informed decision to proceed with neoadjuvant anti hormone therapy in April with tamoxifen due to premenopausal status by blood test with IUD.     She is 2 months into neoadjuvant anti hormone therapy. The breast lesion feels large to me.  "    She is still very much interested in breast conservation, and not rushing into surgery.   So will recommend her to consider restaging breast MRI.   Likely will change her to zoladex and AI.     We discussed the difference b/e AI and tamoxifen.       2.  Left breast LCIS diagnosed in 2012.  We discussed the 3 different approaches of managing LCIS.  Due to her recent diagnosis of invasive breast cancer on the right side, and the plan of getting on anti-hormonal therapy approach, in theory the anti-hormonal therapy should prevent LCIS developing into invasive breast cancer.    3.  Extensive family history of cancers on her father's side.  Recommend her to consider genetic counseling, which will be arranged after the pandemic is over..    Total time is 40 minutes, spent in counseling regarding her disease status, options of antihormone therapy, in terms of efficacy and side effect profiles, duration of anti-hormonal therapy in the preop setting, further work-up plan, follow-up plan.      Oncology Rooming Note    Edie 3, 2020 12:56 PM   Brianna Wall is a 50 year old female who presents for:    Chief Complaint   Patient presents with     Oncology Clinic Visit     Malignant neoplasm of right female breast (H)     Initial Vitals: /80 (BP Location: Left arm, Patient Position: Sitting)   Pulse 83   Temp 98.1  F (36.7  C) (Oral)   Resp 18   Ht 1.702 m (5' 7\")   Wt 89.3 kg (196 lb 12.8 oz)   SpO2 99%   BMI 30.82 kg/m   Estimated body mass index is 30.82 kg/m  as calculated from the following:    Height as of this encounter: 1.702 m (5' 7\").    Weight as of this encounter: 89.3 kg (196 lb 12.8 oz). Body surface area is 2.05 meters squared.  No Pain (0) Comment: Data Unavailable   No LMP recorded. (Menstrual status: IUD).  Allergies reviewed: Yes  Medications reviewed: Yes    Medications: Medication refills not needed today.  Pharmacy name entered into [employer]: [id number] - [place], " [place] - [address]    Clinical concerns: NO     Pinky Lyman, [facility]                Again, thank you for allowing me to participate in the care of your patient.        Sincerely,        Shauna Hewitt MD, MD

## 2020-06-03 NOTE — PROGRESS NOTES
"Oncology Rooming Note    Edie 3, 2020 12:56 PM   Brianna Wall is a 50 year old female who presents for:    Chief Complaint   Patient presents with     Oncology Clinic Visit     Malignant neoplasm of right female breast (H)     Initial Vitals: /80 (BP Location: Left arm, Patient Position: Sitting)   Pulse 83   Temp 98.1  F (36.7  C) (Oral)   Resp 18   Ht 1.702 m (5' 7\")   Wt 89.3 kg (196 lb 12.8 oz)   SpO2 99%   BMI 30.82 kg/m   Estimated body mass index is 30.82 kg/m  as calculated from the following:    Height as of this encounter: 1.702 m (5' 7\").    Weight as of this encounter: 89.3 kg (196 lb 12.8 oz). Body surface area is 2.05 meters squared.  No Pain (0) Comment: Data Unavailable   No LMP recorded. (Menstrual status: IUD).  Allergies reviewed: Yes  Medications reviewed: Yes    Medications: Medication refills not needed today.  Pharmacy name entered into [employer] - [employer] across global offices: [id number] - [place], [place] - [address]    Clinical concerns: NO     Pinky Lyman CMA              "

## 2020-06-03 NOTE — PROGRESS NOTES
ONCOLOGY FOLLOW UP VISIT        PATIENT NAME: Brianna Wall   MRN# [id number]     Date of Visit: Vladislav 3, 2020     YOB: 1970       REASON FOR VISIT/CC:    Right breast central upper quadrant IDC dx 3/2020 at age 50 on neoadjuvant anti hormone therapy due to Covid.       HISTORY OF ONCOLOGY ILLNESS:    She was diagnosed with left breast LCIS back in 2012, was seen by [place] [facility], recommended screening mammogram plus annual screening breast MRI.  March 2020, screening MRI at [facility] on the right breast identified new lobulated enhancement superior central right breast measure about 3.8 cm. No abnormal axilla and mammary chain lymph nodes were identified on MRI.  Subsequent ultrasound identified 12 o'clock position 3 cm from the nipple an irregular hypoechoic solid area measured about 2.8 cm.    March 27, 2020 right breast ultrasound-guided biopsy identified invasive ductal cancer grade 1, negative angiolymphatic invasion, positive grade 2 DCIS, focal atypical lobular hyperplasia, ER WA 99% positive HER-2/hawk negative.    Patient denies any breast complaints in terms of breast lump, skin changes, nipple retraction, nipple discharges, palpable lump in her axilla etc.  Her last MA was 8/2019 and last breast MRI was in 2013 were negative.    She met with breast surgeon Dr. Stack,  expressed wishes of breast conservation. Due toCovid  19 pandemic and American Society of surgical oncology recommendations, sequence of treatment is changed and discussed to recommendation of starting anti-hormonal therapy in the preop setting.      INTERVAL HISTORY  She made informed decision to proceed with neoadjuvant anti hormone therapy in April with tamoxifen due to premenopausal status anticipating this approach is only temporarily due to Covid.      PAST MEDICAL HISTORY  Arthritis.Plantar fasciitis (2009)   Chronic UTI. Urethral stricture (1992).  Duodenal ulcer (1992),   Left breast LCIS dx 11/2012.  IUD for  "menorrhagia, discontinued 2020.  Multiple nevi, Rosacea,Shingles (),Eczema (1992),       MEDICATIONS/ALLERY:  Reviewed in Epic system.        SOCIAL HISTORY:    she has never smoked. She has never used smokeless tobacco. She reports current alcohol use. She reports that she does not use drugs.      FAMILY HISTORY:    Blood Disease in her father; Breast Cancer in her maternal aunt, maternal grandmother, and mother; Cancer - colorectal in her maternal grandfather. \"my mother side\" is full of cancers.       REVIEW OF SYSTEMS:   Hot flushes some, one episode of yeast infection before.      PHYSICAL EXAMINATION:   VITAL SIGNS:  Blood pressure 123/80, pulse 83, temperature 98.1  F (36.7  C), temperature source Oral, resp. rate 18, height 1.702 m (5' 7\"), weight 89.3 kg (196 lb 12.8 oz), SpO2 99 %, not currently breastfeeding.      GENERAL APPEARANCE:  looks like her stated age, very pleasant, not in acute distress.     ECO    ENT, MOUTH: Pupils are equally reactive to light.  Sclerae are anicteric.  Moist oral mucosa without lesion or ulcer.   Negative pharynx.  No oral thrush.   NECK:  Supple.  No jugular venous distention.  Thyroid is not palpable.   LYMPH NODES:  Superficial lymphadenopathy is not appreciable in the bilateral cervical, supraclavicular, axillary or inguinal areas.   CARDIOVASCULAR:  S1, S2 regular with no murmurs or gallops.  No carotid or abdominal bruits.   PULMONARY:  Lungs are clear to auscultation and percussion bilaterally.  There is no wheezing or rhonchi.   GASTROINTESTINAL:  Abdomen is soft, nontender.  No hepatosplenomegaly.  No signs of ascites.  No mass appreciable.   MUSCULOSKELETAL/EXTREMITIES:  No edema.  No cyanotic changes.  No signs of joint deformity.  No lymphedema.   NEUROLOGIC:  Cranial nerves II-XII are grossly intact.  Sensation intact.  Muscle strength and muscle tone symmetrical, 5/5 throughout.   BACK  No spinal or paraspinal tenderness.  No CVA tenderness. "   SKIN:  No petechiae.  No rash.  No signs of cellulitis.   BREASTS/CHEST/AXILLA:  exam revealed large central right breast mass feels like 3-5 cm to me. No nipple or skin changes.   No palpable axilla mass or LN.  left breast exam is negative  PSYCHIATRIC: Oriented to time, person, and places. Normal mood and affect. Good memory. Proper insight and judgement.         CURRENT LAB DATA REVIEWED TODAY WITH PATIENT TODAY  4/2020 hormone level is most consistent with premenopausal state.       March 27, 2020 right breast ultrasound-guided biopsy identified invasive ductal cancer grade 1, negative angiolymphatic invasion, positive grade 2 DCIS, focal atypical lobular hyperplasia, ER MI 99% positive HER-2/hawk negative.        CURRENT IMAGING REVIEWED TODAY WITH PATIENT  March 2020, screening MRI at [facility] on the breast identified new lobulated enhancement superior central right breast measure about 3.8 cm. No abnormal axilla and mammary chain lymph nodes were identified on MRI.  Subsequent ultrasound identified 12 o'clock position 3 cm from the nipple an irregular hypoechoic solid area measured about 2.8 cm.         OLD DATA REVIEWED TODAY WITH SUMMARY WITH PATEINT  MA was 8/2019 and last breast MRI was in 2013 were negative.    Left breast LCIS dx 11/2012.        ASSESSMENT AND PLAN DISCUSSED WITH PATIENT TODAY:  1.  Clinical T2-3N0 right upper central quadrant invasive ductal cancer grade 1 ER MI 99% positive HER-2/hawk negative, diagnosed via screening breast MRI.    She made informed decision to proceed with neoadjuvant anti hormone therapy in April with tamoxifen due to premenopausal status by blood test with IUD, anticipating this approach is only temporarily due to Covid.    She is 2 months into neoadjuvant anti hormone therapy. The breast lesion feels large to me. I do not have baseline to compare since her new patient visit was a virtual visit due to Covid.     Rcommend her to consider restaging breast MRI.      Today, she indicates is very much interested in breast conservation, and not rushing into surgery.   This is a change from our prior plan in terms of temporary anti hormone therapy due to Covid.     Patient is informed today, hospital is opening up for surgery, I encourage her consider move surgery sooner than later.     She is informed that if we are going to do longer term neoadjuvant anti hormone therapy, would advice change to AI + OVRAIN FUNCTION SUPPRESSION.      We discussed the difference b/e AI and tamoxifen.       2.  Left breast LCIS diagnosed in 2012.  We discussed the 3 different approaches of managing LCIS.  Due to her recent diagnosis of invasive breast cancer on the right side, and the plan of getting on anti-hormonal therapy approach, in theory the anti-hormonal therapy should prevent LCIS developing into invasive breast cancer.    3.  Extensive family history of cancers on her father's side.  Recommend her to consider genetic counseling, which will be arranged after the pandemic is over..    Total time is 40 minutes, spent in counseling regarding her disease status, options of antihormone therapy, in terms of efficacy and side effect profiles, duration of anti-hormonal therapy in the preop setting, further work-up plan, follow-up plan.

## 2020-06-05 ENCOUNTER — TRANSFERRED RECORDS (OUTPATIENT)
Dept: HEALTH INFORMATION MANAGEMENT | Facility: CLINIC | Age: 50
End: 2020-06-05

## 2020-06-08 ENCOUNTER — TELEPHONE (OUTPATIENT)
Dept: ONCOLOGY | Facility: CLINIC | Age: 50
End: 2020-06-08

## 2020-06-08 NOTE — TELEPHONE ENCOUNTER
Left voicemail message for patient requesting a return call regarding scheduling return virtual appointment with Dr Hewitt. Schedule after breast MRI /ultrasound are completed.

## 2020-06-11 ENCOUNTER — TRANSFERRED RECORDS (OUTPATIENT)
Dept: HEALTH INFORMATION MANAGEMENT | Facility: CLINIC | Age: 50
End: 2020-06-11

## 2020-06-12 ENCOUNTER — TRANSFERRED RECORDS (OUTPATIENT)
Dept: HEALTH INFORMATION MANAGEMENT | Facility: CLINIC | Age: 50
End: 2020-06-12

## 2020-06-12 ENCOUNTER — TELEPHONE (OUTPATIENT)
Dept: ONCOLOGY | Facility: CLINIC | Age: 50
End: 2020-06-12

## 2020-06-12 NOTE — TELEPHONE ENCOUNTER
Patient returned call regarding follow up appointments. Patient stated she will be pursuing treatment at [facility].

## 2020-06-15 NOTE — TELEPHONE ENCOUNTER
Dr. Hewitt notified of patient transferring care and also that she cancelled her breast MRI that was to be done today at [facility].  No further action at this time.    Frederic Irwin, TRINYN, RN, OCN  Oncology Care Coordinator  [facility]

## 2020-06-18 ENCOUNTER — TRANSFERRED RECORDS (OUTPATIENT)
Dept: HEALTH INFORMATION MANAGEMENT | Facility: CLINIC | Age: 50
End: 2020-06-18

## 2020-06-29 ENCOUNTER — TRANSFERRED RECORDS (OUTPATIENT)
Dept: HEALTH INFORMATION MANAGEMENT | Facility: CLINIC | Age: 50
End: 2020-06-29

## 2020-07-06 ENCOUNTER — TRANSFERRED RECORDS (OUTPATIENT)
Dept: HEALTH INFORMATION MANAGEMENT | Facility: CLINIC | Age: 50
End: 2020-07-06

## 2020-07-21 ENCOUNTER — TRANSFERRED RECORDS (OUTPATIENT)
Dept: HEALTH INFORMATION MANAGEMENT | Facility: CLINIC | Age: 50
End: 2020-07-21

## 2020-08-08 PROBLEM — C50.912 BILATERAL BREAST CANCER (H): Status: ACTIVE | Noted: 2020-01-01

## 2020-08-08 PROBLEM — C50.911 MALIGNANT NEOPLASM OF RIGHT FEMALE BREAST (H): Status: RESOLVED | Noted: 2020-03-31 | Resolved: 2020-08-08

## 2020-08-08 PROBLEM — C50.911 BILATERAL BREAST CANCER (H): Status: ACTIVE | Noted: 2020-01-01

## 2020-08-10 ENCOUNTER — OFFICE VISIT (OUTPATIENT)
Dept: OBGYN | Facility: CLINIC | Age: 50
End: 2020-08-10
Payer: COMMERCIAL

## 2020-08-10 VITALS
OXYGEN SATURATION: 98 % | SYSTOLIC BLOOD PRESSURE: 137 MMHG | WEIGHT: 194 LBS | DIASTOLIC BLOOD PRESSURE: 84 MMHG | HEIGHT: 67 IN | BODY MASS INDEX: 30.45 KG/M2 | HEART RATE: 104 BPM

## 2020-08-10 DIAGNOSIS — Z01.419 ENCOUNTER FOR GYNECOLOGICAL EXAMINATION WITHOUT ABNORMAL FINDING: Primary | ICD-10-CM

## 2020-08-10 PROCEDURE — G0145 SCR C/V CYTO,THINLAYER,RESCR: HCPCS | Performed by: OBSTETRICS & GYNECOLOGY

## 2020-08-10 PROCEDURE — 87624 HPV HI-RISK TYP POOLED RSLT: CPT | Performed by: OBSTETRICS & GYNECOLOGY

## 2020-08-10 PROCEDURE — 99396 PREV VISIT EST AGE 40-64: CPT | Performed by: OBSTETRICS & GYNECOLOGY

## 2020-08-10 ASSESSMENT — MIFFLIN-ST. JEOR: SCORE: 1527.61

## 2020-08-10 NOTE — PATIENT INSTRUCTIONS
If you have any questions regarding your visit, Please contact your care team.     [email or web address] [facility]: [phone]  [facility] CLINIC HOURS TELEPHONE NUMBER       Kuldip Pritchett M.D.      Laurie Samson-Medical Assistant    Maddie-  Thuy-     Monday-[place]  8:00a.m-4:45 p.m  Tuesday-[place]  9:00a.m-4:00 p.m  Wednesday-[place] 8:00a.m-4:45 p.m.  Thursday-[place]  8:00a.m-4:45 p.m.  Friday-[place]  8:00a.m-4:45 p.m. [facility]  [address]  [place], [place] [address] [phone] ask [facility] [place]  [phone] Fax  Imaging [phone]    [facility]  [address]  [place], [place] [id number] [phone]    [facility]  [address] [facility] [place] [address] [phone] [facility]  [phone] Fax  Imaging [phone]     Urgent Care locations:    [facility] Monday-Friday  5 pm - 9 pm  Saturday and Sunday   9 am - 5 pm  Monday-Friday   11 am - 9 pm  Saturday and Sunday   9 am - 5 pm   [phone]   If you need a medication refill, please contact your pharmacy. Please allow 3 business days for your refill to be completed.  As always, Thank you for trusting us with your healthcare needs!

## 2020-08-12 LAB
COPATH REPORT: NORMAL
PAP: NORMAL

## 2020-08-12 NOTE — PROGRESS NOTES
"Brianna Wall is a 50 year old year old who presents for annual exam.     HPI: Doing fairly well.  LMP 04/2020.   Significant interval changes: bilateral breast cancer found on MRI and had bilateral nipple sparing mastectomy with tissue expanders for planned implant reconstruction. No chemo or radiation treatment needed. Continuing on tamoxifen for ten yrs for adjuvant treatment- reviewed risk/benefit. Emotionally doing fine.   Current significant symptoms: none.  Other problems or concerns: second shingles vaccine is due.    Contraceptive method is condoms.     Past medical, obstetrical, surgical, family and social history reviewed and as noted or updated in chart.     Allergies, meds and supplements are as noted or updated in chart.     ROS:     Systems reviewed include constitutional; breast;                 cardiac; respiratory; gastrointestinal; genitourinary;                                musculoskeletal; integumentary; psychological;                                hematologic/lymphatic and endocrine.                  These systems were negative for significant symptoms except                 for the following additional: none ; see HPI.                                Exam:  VS as noted./84 (BP Location: Left arm, Patient Position: Chair, Cuff Size: Adult Regular)   Pulse 104   Ht 1.702 m (5' 7\")   Wt 88 kg (194 lb)   SpO2 98%   Breastfeeding No   BMI 30.38 kg/m                      Neck, nodes, abd and pelvis were                             normal or negative except for, or in particular noting, the following                pertinent findings: uterus anterior, no cervical polyp.      Assessment: Gyn exam. Problem List updated.    Plan and Recommendations: Symptoms, problems and concerns reviewed. Health habits and vaccinations reviewed. Calcium/Vitamin D and multi-vitamin supplements instructions given. Breast/skin self-exam awareness. Screening tests including limitations discussed. " Follow-up visits discussed. See orders. Perimenopause suspected and will observe without placing another Mirena. Continue Vaughn and other follow-up care. Return to clinic 1 year.    Brianna was seen today for physical.    Diagnoses and all orders for this visit:    Encounter for gynecological examination without abnormal finding  -     Pap imaged thin layer screen with HPV - recommended age 30 - 65 years (select HPV order below)  -     HPV High Risk Types DNA Cervical        Kuldip Pritchett MD

## 2020-08-14 LAB
FINAL DIAGNOSIS: NORMAL
HPV HR 12 DNA CVX QL NAA+PROBE: NEGATIVE
HPV16 DNA SPEC QL NAA+PROBE: NEGATIVE
HPV18 DNA SPEC QL NAA+PROBE: NEGATIVE
SPECIMEN DESCRIPTION: NORMAL
SPECIMEN SOURCE CVX/VAG CYTO: NORMAL

## 2020-08-24 ENCOUNTER — ALLIED HEALTH/NURSE VISIT (OUTPATIENT)
Dept: NURSING | Facility: CLINIC | Age: 50
End: 2020-08-24
Payer: COMMERCIAL

## 2020-08-24 DIAGNOSIS — Z23 NEED FOR VACCINATION: Primary | ICD-10-CM

## 2020-08-24 PROCEDURE — 90471 IMMUNIZATION ADMIN: CPT

## 2020-08-24 PROCEDURE — 90750 HZV VACC RECOMBINANT IM: CPT

## 2020-08-24 PROCEDURE — 99207 ZZC NO CHARGE NURSE ONLY: CPT

## 2020-10-14 ENCOUNTER — ALLIED HEALTH/NURSE VISIT (OUTPATIENT)
Dept: NURSING | Facility: CLINIC | Age: 50
End: 2020-10-14
Payer: COMMERCIAL

## 2020-10-14 DIAGNOSIS — Z23 NEED FOR PROPHYLACTIC VACCINATION AND INOCULATION AGAINST INFLUENZA: Primary | ICD-10-CM

## 2020-10-14 PROCEDURE — 90682 RIV4 VACC RECOMBINANT DNA IM: CPT

## 2020-10-14 PROCEDURE — 99207 PR NO CHARGE NURSE ONLY: CPT

## 2020-10-14 PROCEDURE — 90471 IMMUNIZATION ADMIN: CPT

## 2020-11-09 ENCOUNTER — MYC MEDICAL ADVICE (OUTPATIENT)
Dept: OBGYN | Facility: CLINIC | Age: 50
End: 2020-11-09

## 2020-11-09 DIAGNOSIS — B37.31 YEAST INFECTION OF THE VAGINA: ICD-10-CM

## 2020-11-09 DIAGNOSIS — B37.31 YEAST INFECTION OF THE VAGINA: Primary | ICD-10-CM

## 2020-11-09 LAB
SPECIMEN SOURCE: ABNORMAL
WET PREP SPEC: ABNORMAL

## 2020-11-09 PROCEDURE — 87210 SMEAR WET MOUNT SALINE/INK: CPT | Performed by: OBSTETRICS & GYNECOLOGY

## 2020-11-09 RX ORDER — TERCONAZOLE 0.4 %
1 CREAM WITH APPLICATOR VAGINAL AT BEDTIME
Qty: 45 G | Refills: 0 | Status: SHIPPED | OUTPATIENT
Start: 2020-11-09 | End: 2020-11-18

## 2020-11-09 RX ORDER — FLUCONAZOLE 150 MG/1
150 TABLET ORAL WEEKLY
Qty: 4 TABLET | Refills: 0 | Status: SHIPPED | OUTPATIENT
Start: 2020-11-23 | End: 2021-07-02

## 2020-11-18 ENCOUNTER — OFFICE VISIT (OUTPATIENT)
Dept: FAMILY MEDICINE | Facility: CLINIC | Age: 50
End: 2020-11-18
Payer: COMMERCIAL

## 2020-11-18 VITALS
DIASTOLIC BLOOD PRESSURE: 60 MMHG | TEMPERATURE: 98.1 F | OXYGEN SATURATION: 98 % | SYSTOLIC BLOOD PRESSURE: 112 MMHG | WEIGHT: 193.9 LBS | HEIGHT: 66 IN | HEART RATE: 78 BPM | RESPIRATION RATE: 15 BRPM | BODY MASS INDEX: 31.16 KG/M2

## 2020-11-18 DIAGNOSIS — Z13.220 LIPID SCREENING: Primary | ICD-10-CM

## 2020-11-18 DIAGNOSIS — Z13.1 ENCOUNTER FOR SCREENING EXAMINATION FOR IMPAIRED GLUCOSE REGULATION AND DIABETES MELLITUS: ICD-10-CM

## 2020-11-18 DIAGNOSIS — Z90.13 ACQUIRED ABSENCE OF BILATERAL BREASTS AND NIPPLES: ICD-10-CM

## 2020-11-18 DIAGNOSIS — N76.0 RECURRENT VAGINITIS: ICD-10-CM

## 2020-11-18 PROCEDURE — 82947 ASSAY GLUCOSE BLOOD QUANT: CPT | Performed by: NURSE PRACTITIONER

## 2020-11-18 PROCEDURE — 80061 LIPID PANEL: CPT | Performed by: NURSE PRACTITIONER

## 2020-11-18 PROCEDURE — 99213 OFFICE O/P EST LOW 20 MIN: CPT | Performed by: NURSE PRACTITIONER

## 2020-11-18 PROCEDURE — 36415 COLL VENOUS BLD VENIPUNCTURE: CPT | Performed by: NURSE PRACTITIONER

## 2020-11-18 ASSESSMENT — ENCOUNTER SYMPTOMS
RESPIRATORY NEGATIVE: 1
GASTROINTESTINAL NEGATIVE: 1
CONSTITUTIONAL NEGATIVE: 1
CARDIOVASCULAR NEGATIVE: 1

## 2020-11-18 ASSESSMENT — MIFFLIN-ST. JEOR: SCORE: 1515.24

## 2020-11-18 NOTE — PROGRESS NOTES
"Subjective     Brianna Wall is a 50 year old female who presents to clinic today for the following health issues:    HPI         Pt here to have vitals and labs checked for her biometric form-- no acute concerns.    Discussed her upcoming breast reconstruction and her ongoing issues with recurrent vaginitis while on tamoxifen.  Discussed symptomatic cares      How many servings of fruits and vegetables do you eat daily?  4 or more    On average, how many sweetened beverages do you drink each day (Examples: soda, juice, sweet tea, etc.  Do NOT count diet or artificially sweetened beverages)?   0    How many days per week do you exercise enough to make your heart beat faster? 3 or less    How many minutes a day do you exercise enough to make your heart beat faster? 60 or more    How many days per week do you miss taking your medication? 0      Review of Systems   Constitutional: Negative.    Respiratory: Negative.    Cardiovascular: Negative.    Gastrointestinal: Negative.    Genitourinary: Positive for vaginal discharge. Negative for vaginal pain.            Objective    /60   Pulse 78   Temp 98.1  F (36.7  C) (Oral)   Resp 15   Ht 1.683 m (5' 6.25\")   Wt 88 kg (193 lb 14.4 oz)   LMP 04/15/2020 (Approximate)   SpO2 98%   BMI 31.06 kg/m    Body mass index is 31.06 kg/m .  Physical Exam  Constitutional:       General: She is not in acute distress.     Appearance: She is well-developed.   Eyes:      Conjunctiva/sclera: Conjunctivae normal.   Neck:      Musculoskeletal: Neck supple.   Cardiovascular:      Rate and Rhythm: Normal rate and regular rhythm.      Heart sounds: Normal heart sounds.   Pulmonary:      Effort: Pulmonary effort is normal.      Breath sounds: Normal breath sounds.   Abdominal:      General: Bowel sounds are normal.      Palpations: Abdomen is soft. There is no mass.   Lymphadenopathy:      Cervical: No cervical adenopathy.   Skin:     General: Skin is warm and dry.      " "Findings: No rash.       Assessment & Plan   Problem List Items Addressed This Visit     Acquired absence of bilateral breasts and nipples    Recurrent vaginitis     Discussed symptomatic cares with patient           Other Visit Diagnoses     Lipid screening    -  Primary    Relevant Orders    Lipid panel reflex to direct LDL Non-fasting (Completed)    Encounter for screening examination for impaired glucose regulation and diabetes mellitus        Relevant Orders    Glucose (Completed)             BMI:   Estimated body mass index is 31.06 kg/m  as calculated from the following:    Height as of this encounter: 1.683 m (5' 6.25\").    Weight as of this encounter: 88 kg (193 lb 14.4 oz).                Return in about 1 year (around 11/18/2021) for Routine Visit.    YUSUF Mast [facility]    "

## 2020-11-19 LAB
CHOLEST SERPL-MCNC: 186 MG/DL
GLUCOSE SERPL-MCNC: 95 MG/DL (ref 70–99)
HDLC SERPL-MCNC: 34 MG/DL
LDLC SERPL CALC-MCNC: 110 MG/DL
NONHDLC SERPL-MCNC: 152 MG/DL
TRIGL SERPL-MCNC: 211 MG/DL

## 2021-03-18 ENCOUNTER — IMMUNIZATION (OUTPATIENT)
Dept: NURSING | Facility: CLINIC | Age: 51
End: 2021-03-18
Payer: COMMERCIAL

## 2021-03-18 PROCEDURE — 0011A PR COVID VAC MODERNA 100 MCG/0.5 ML IM: CPT

## 2021-03-18 PROCEDURE — 91301 PR COVID VAC MODERNA 100 MCG/0.5 ML IM: CPT

## 2021-04-15 ENCOUNTER — IMMUNIZATION (OUTPATIENT)
Dept: NURSING | Facility: CLINIC | Age: 51
End: 2021-04-15
Attending: INTERNAL MEDICINE
Payer: COMMERCIAL

## 2021-04-15 PROCEDURE — 0012A PR COVID VAC MODERNA 100 MCG/0.5 ML IM: CPT

## 2021-04-15 PROCEDURE — 91301 PR COVID VAC MODERNA 100 MCG/0.5 ML IM: CPT

## 2021-07-02 ENCOUNTER — MYC MEDICAL ADVICE (OUTPATIENT)
Dept: OBGYN | Facility: CLINIC | Age: 51
End: 2021-07-02

## 2021-07-02 ENCOUNTER — OFFICE VISIT (OUTPATIENT)
Dept: FAMILY MEDICINE | Facility: CLINIC | Age: 51
End: 2021-07-02
Payer: COMMERCIAL

## 2021-07-02 VITALS
HEART RATE: 76 BPM | WEIGHT: 190 LBS | SYSTOLIC BLOOD PRESSURE: 100 MMHG | OXYGEN SATURATION: 98 % | BODY MASS INDEX: 30.53 KG/M2 | TEMPERATURE: 98.1 F | DIASTOLIC BLOOD PRESSURE: 60 MMHG | HEIGHT: 66 IN | RESPIRATION RATE: 16 BRPM

## 2021-07-02 DIAGNOSIS — N92.6 IRREGULAR PERIODS: Primary | ICD-10-CM

## 2021-07-02 DIAGNOSIS — C50.912 BILATERAL MALIGNANT NEOPLASM OF BREAST IN FEMALE, UNSPECIFIED ESTROGEN RECEPTOR STATUS, UNSPECIFIED SITE OF BREAST (H): ICD-10-CM

## 2021-07-02 DIAGNOSIS — N92.6 IRREGULAR MENSES: Primary | ICD-10-CM

## 2021-07-02 DIAGNOSIS — C50.911 BILATERAL MALIGNANT NEOPLASM OF BREAST IN FEMALE, UNSPECIFIED ESTROGEN RECEPTOR STATUS, UNSPECIFIED SITE OF BREAST (H): ICD-10-CM

## 2021-07-02 PROCEDURE — 99214 OFFICE O/P EST MOD 30 MIN: CPT | Performed by: PHYSICIAN ASSISTANT

## 2021-07-02 ASSESSMENT — PAIN SCALES - GENERAL: PAINLEVEL: NO PAIN (0)

## 2021-07-02 ASSESSMENT — MIFFLIN-ST. JEOR: SCORE: 1492.55

## 2021-07-02 NOTE — PROGRESS NOTES
Assessment & Plan     Irregular periods  Ongoing for past several months. Had no periods while on Tamoxifen from April 2020-November 2020. Then got period back in January 2021 and had normal periods in January, Feb, and March. Then no period until June 17 when she had a few days of light spotting, went away for about 1 week and now returned again a few days ago. Small amount of dark blood from cervix today. Pelvic exam otherwise normal. Discussed could be related to perimenopause but especially given history of breast cancer could do further work up including labs and pelvic US. She would like to discuss with her OB/GYN before proceeding with further testing. She will call her OB/GYN to discuss.     Bilateral malignant neoplasm of breast in female, unspecified estrogen receptor status, unspecified site of breast (H)  Diagnosed 3/2020, treated with mastectomy and tamoxifen. Follows with oncology.    Return in about 1 month (around 8/2/2021) for Preventive Physical Exam.    30 minutes spent on the date of the encounter doing chart review, history and exam, documentation and further activities per the note    PRIYANKA Holbrook [facility] LUPE Reed is a 51 year old who presents for the following health issues     HPI Vaginal Bleeding    Menstrual Concern  Onset/Duration: Started June 17  Description:   Duration of bleeding episodes: 4-5 days  Frequency of periods: (1st day of one to 1st day of next):  every 30 days before starting Tamoxifen, completed Tamoxifen in Nov  Describe bleeding/flow:   Clots: no  Number of pads/day: panti liners only        Cramping: none  Accompanying Signs & Symptoms:  Lightheadedness: no  Temperature intolerance: no  Nosebleeds/Easy bruising: no  Vaginal Discharge: no  Acne: no  Change in body hair: no  History:  No LMP recorded. spotting since Monday, last period was March  Previous normal periods: YES  Contraceptive use: condoms  Sexually  "active: YES  Any bleeding after intercourse: no  Abnormal PAP Smears: no  Precipitating or alleviating factors: None  Therapies tried and outcome: None    Diagnosed with breast cancer in March 2020, started Tamoxifen in April 2020. Prior to starting Tamoxifen, her periods were normal. She did not have any periods while on Tamoxifen but she did have recurrent yeast infections. She stopped Tamoxifen in November, yeast infections stopped. Got period back in January 2021, had period in February and March but then no period until June 17.    On June 17, few drops of bright blood. Used a tampon with only a little bit of pink on tampon. Has been wearing pantyliners, minimal bleeding for 4 days and tapered off.    Started again last Monday. A few drops. Thin, seems brighter in color. No steady flow. A few drops every few hours - enough to wear a panty liner. A few drops in toilet. Doesn't seem like past periods.    No rectal bleeding.   No urinary symptoms - no blood in urine, dysuria, frequency, urgency.  No abdominal pain, nausea, vomiting, bowel changes.  Otherwise feeling well.    Review of Systems   Constitutional, HEENT, cardiovascular, pulmonary, gi and gu systems are negative, except as otherwise noted.      Objective    /60   Pulse 76   Temp 98.1  F (36.7  C) (Oral)   Resp 16   Ht 1.683 m (5' 6.25\")   Wt 86.2 kg (190 lb)   SpO2 98%   BMI 30.44 kg/m    Body mass index is 30.44 kg/m .  Physical Exam   GENERAL: healthy, alert and no distress  ABDOMEN: soft, nontender, no hepatosplenomegaly, no masses and bowel sounds normal   (female): normal female external genitalia, normal urethral meatus, vaginal mucosa pink, moist, well rugated and normal cervix, adnexae, and uterus without masses. Small amount of reddish brown blood from cervical os.  PSYCH: mentation appears normal, affect normal/bright  "

## 2021-07-02 NOTE — TELEPHONE ENCOUNTER
Pt last seen 8/10/2020 for annual exam.    Pt stats last period was in March 2021, no bleeding/spotting until June and again now. Pt had pelvic exam done w/ a PA today and was told everything looked normal.    Pt denies fever, pain, cramping, nausea, or dizziness. Pt asking if a follow up in clinic would be recommended or imaging/labs first.     RN routing to provider for advisement.    Haritha Nunez RN on 7/2/2021 at 4:24 PM

## 2021-07-02 NOTE — PATIENT INSTRUCTIONS
Bleeding is coming from cervix  Irregular bleeding could be related to perimenopausal transition  Discussed possible labs and US vs follow-up with OB/GYN  Follow-up with OB/GYN for their input

## 2021-07-12 ENCOUNTER — HOSPITAL ENCOUNTER (OUTPATIENT)
Dept: ULTRASOUND IMAGING | Facility: CLINIC | Age: 51
Discharge: HOME | End: 2021-07-12
Attending: OBSTETRICS & GYNECOLOGY | Admitting: OBSTETRICS & GYNECOLOGY
Payer: COMMERCIAL

## 2021-07-12 DIAGNOSIS — N92.6 IRREGULAR MENSES: ICD-10-CM

## 2021-07-12 PROBLEM — N83.201 CYST OF RIGHT OVARY: Status: ACTIVE | Noted: 2021-07-12

## 2021-07-12 PROBLEM — D25.1 INTRAMURAL LEIOMYOMA OF UTERUS: Status: ACTIVE | Noted: 2021-07-12

## 2021-07-12 PROCEDURE — 76830 TRANSVAGINAL US NON-OB: CPT

## 2021-07-13 ENCOUNTER — LAB (OUTPATIENT)
Dept: LAB | Facility: CLINIC | Age: 51
End: 2021-07-13
Payer: COMMERCIAL

## 2021-07-13 DIAGNOSIS — N92.6 IRREGULAR MENSES: ICD-10-CM

## 2021-07-13 PROCEDURE — 83001 ASSAY OF GONADOTROPIN (FSH): CPT

## 2021-07-13 PROCEDURE — 36415 COLL VENOUS BLD VENIPUNCTURE: CPT

## 2021-07-14 LAB — FSH SERPL-ACNC: 2.9 IU/L

## 2021-09-19 ENCOUNTER — HEALTH MAINTENANCE LETTER (OUTPATIENT)
Age: 51
End: 2021-09-19

## 2021-11-09 ENCOUNTER — TRANSFERRED RECORDS (OUTPATIENT)
Dept: HEALTH INFORMATION MANAGEMENT | Facility: CLINIC | Age: 51
End: 2021-11-09
Payer: COMMERCIAL

## 2021-11-25 ASSESSMENT — ENCOUNTER SYMPTOMS
NAUSEA: 0
DYSURIA: 0
MYALGIAS: 0
SORE THROAT: 0
PALPITATIONS: 0
FREQUENCY: 0
HEADACHES: 0
FEVER: 0
COUGH: 0
WEAKNESS: 0
EYE PAIN: 0
HEARTBURN: 0
BREAST MASS: 0
DIZZINESS: 0
CONSTIPATION: 0
CHILLS: 0
SHORTNESS OF BREATH: 0
PARESTHESIAS: 0
HEMATURIA: 0
ABDOMINAL PAIN: 0
NERVOUS/ANXIOUS: 0
HEMATOCHEZIA: 0
JOINT SWELLING: 0
DIARRHEA: 0
ARTHRALGIAS: 0

## 2021-12-01 ENCOUNTER — OFFICE VISIT (OUTPATIENT)
Dept: FAMILY MEDICINE | Facility: CLINIC | Age: 51
End: 2021-12-01
Payer: COMMERCIAL

## 2021-12-01 VITALS
WEIGHT: 187.2 LBS | OXYGEN SATURATION: 99 % | BODY MASS INDEX: 30.08 KG/M2 | HEART RATE: 78 BPM | HEIGHT: 66 IN | SYSTOLIC BLOOD PRESSURE: 118 MMHG | RESPIRATION RATE: 20 BRPM | DIASTOLIC BLOOD PRESSURE: 78 MMHG | TEMPERATURE: 98.3 F

## 2021-12-01 DIAGNOSIS — C50.911 BILATERAL MALIGNANT NEOPLASM OF BREAST IN FEMALE, UNSPECIFIED ESTROGEN RECEPTOR STATUS, UNSPECIFIED SITE OF BREAST (H): ICD-10-CM

## 2021-12-01 DIAGNOSIS — E78.00 ELEVATED CHOLESTEROL: ICD-10-CM

## 2021-12-01 DIAGNOSIS — C50.912 BILATERAL MALIGNANT NEOPLASM OF BREAST IN FEMALE, UNSPECIFIED ESTROGEN RECEPTOR STATUS, UNSPECIFIED SITE OF BREAST (H): ICD-10-CM

## 2021-12-01 DIAGNOSIS — Z00.00 ROUTINE GENERAL MEDICAL EXAMINATION AT A HEALTH CARE FACILITY: Primary | ICD-10-CM

## 2021-12-01 PROBLEM — Z90.10 ABSENCE OF BREAST: Status: ACTIVE | Noted: 2020-09-04

## 2021-12-01 PROBLEM — N76.0 RECURRENT VAGINITIS: Status: RESOLVED | Noted: 2020-11-18 | Resolved: 2021-12-01

## 2021-12-01 PROCEDURE — 36415 COLL VENOUS BLD VENIPUNCTURE: CPT | Performed by: NURSE PRACTITIONER

## 2021-12-01 PROCEDURE — 80061 LIPID PANEL: CPT | Performed by: NURSE PRACTITIONER

## 2021-12-01 PROCEDURE — 0064A COVID-19,PF,MODERNA (18+ YRS BOOSTER .25ML): CPT | Performed by: NURSE PRACTITIONER

## 2021-12-01 PROCEDURE — 90471 IMMUNIZATION ADMIN: CPT | Performed by: NURSE PRACTITIONER

## 2021-12-01 PROCEDURE — 99396 PREV VISIT EST AGE 40-64: CPT | Mod: 25 | Performed by: NURSE PRACTITIONER

## 2021-12-01 PROCEDURE — 82947 ASSAY GLUCOSE BLOOD QUANT: CPT | Performed by: NURSE PRACTITIONER

## 2021-12-01 PROCEDURE — 91306 COVID-19,PF,MODERNA (18+ YRS BOOSTER .25ML): CPT | Performed by: NURSE PRACTITIONER

## 2021-12-01 PROCEDURE — 90682 RIV4 VACC RECOMBINANT DNA IM: CPT | Performed by: NURSE PRACTITIONER

## 2021-12-01 ASSESSMENT — ENCOUNTER SYMPTOMS
CHILLS: 0
JOINT SWELLING: 0
ARTHRALGIAS: 0
CONSTIPATION: 0
HEMATURIA: 0
EYE PAIN: 0
FEVER: 0
SORE THROAT: 0
DYSURIA: 0
MYALGIAS: 0
WEAKNESS: 0
DIZZINESS: 0
HEMATOCHEZIA: 0
HEARTBURN: 0
BREAST MASS: 0
ABDOMINAL PAIN: 0
NERVOUS/ANXIOUS: 0
NAUSEA: 0
COUGH: 0
PALPITATIONS: 0
FREQUENCY: 0
PARESTHESIAS: 0
HEADACHES: 0
DIARRHEA: 0
SHORTNESS OF BREATH: 0

## 2021-12-01 ASSESSMENT — MIFFLIN-ST. JEOR: SCORE: 1479.85

## 2021-12-01 ASSESSMENT — PAIN SCALES - GENERAL: PAINLEVEL: NO PAIN (0)

## 2021-12-02 NOTE — ASSESSMENT & PLAN NOTE
Continues with annual oncology visits.  At this time has declined tamoxifen and is managing her risk with weight reduction

## 2021-12-03 LAB
CHOLEST SERPL-MCNC: 261 MG/DL
FASTING STATUS PATIENT QL REPORTED: NO
FASTING STATUS PATIENT QL REPORTED: NO
GLUCOSE BLD-MCNC: 96 MG/DL (ref 70–99)
HDLC SERPL-MCNC: 50 MG/DL
LDLC SERPL CALC-MCNC: 180 MG/DL
NONHDLC SERPL-MCNC: 211 MG/DL
TRIGL SERPL-MCNC: 157 MG/DL

## 2021-12-09 ENCOUNTER — LAB (OUTPATIENT)
Dept: LAB | Facility: CLINIC | Age: 51
End: 2021-12-09
Payer: COMMERCIAL

## 2021-12-09 DIAGNOSIS — E78.00 ELEVATED CHOLESTEROL: ICD-10-CM

## 2021-12-09 PROCEDURE — 36415 COLL VENOUS BLD VENIPUNCTURE: CPT

## 2021-12-09 PROCEDURE — 80061 LIPID PANEL: CPT

## 2021-12-17 LAB
CHOLEST SERPL-MCNC: 210 MG/DL
FASTING STATUS PATIENT QL REPORTED: YES
HDLC SERPL-MCNC: 51 MG/DL
LDLC SERPL CALC-MCNC: 143 MG/DL
NONHDLC SERPL-MCNC: 159 MG/DL
TRIGL SERPL-MCNC: 81 MG/DL

## 2022-02-17 PROBLEM — Z90.13 ACQUIRED ABSENCE OF BILATERAL BREASTS AND NIPPLES: Status: RESOLVED | Noted: 2020-09-04 | Resolved: 2021-12-01

## 2022-07-27 NOTE — PROGRESS NOTES
SUBJECTIVE:   Brianna Wall is a 49 year old female who presents to clinic today for the following   health issues:        Tick Bite      Duration: Thursday     Description (location/character/radiation): left upper inner thigh     Intensity:  No symptoms     Accompanying signs and symptoms: redness    History (similar episodes/previous evaluation): yes in the past     Precipitating or alleviating factors: Had been in the woods in [facility] and found a deer tick attached     Therapies tried and outcome: None           Additional history:     Reviewed  and updated as needed this visit by clinical staff         Reviewed and updated as needed this visit by Provider         See under ROS    Patient Active Problem List   Diagnosis     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     Impaired fasting glucose       Current Outpatient Medications   Medication Sig Dispense Refill     levonorgestrel (MIRENA) 20 MCG/24HR IUD 1 each by Intrauterine route once       melatonin 5 MG TABS Reported on 2/27/2017       MULTI-VITAMIN OR TABS        naproxen (NAPROSYN) 500 MG tablet Take 1 tablet (500 mg) by mouth 2 times daily as needed for moderate pain 30 tablet 3     vitamin B complex with vitamin C (VITAMIN  B COMPLEX) TABS tablet Take 1 tablet by mouth daily       VITAMIN C 1000 MG OR TABS 3 tabs QD       VITAMIN D PO Reported on 2/27/2017       VITAMIN E 1000 UNIT OR CAPS 1 tab qd           ROS:  CONSTITUTIONAL:NEGATIVE for fever, chills, change in weight  INTEGUMENTARY/SKIN: see below  PSYCHIATRIC: NEGATIVE for changes in mood or affect    Noted an attached deer tick last Thursday. She was in [facility]  Believes attached probably < 24 hours.  No rash.     Notes probably no concern, but does wonder if she should do something preventively.  Notes she will be spending a lot of time in the woods this summer ([place], near [place]; known [place] area).    She does recall getting titers in the past followed by a titer  "again in 6 weeks.        OBJECTIVE:     /72 (BP Location: Right arm, Cuff Size: Adult Large)   Pulse 77   Temp 98.4  F (36.9  C) (Oral)   Resp 16   Ht 1.702 m (5' 7\")   Wt 90.7 kg (200 lb)   SpO2 99%   BMI 31.32 kg/m    Body mass index is 31.32 kg/m .  GENERAL APPEARANCE: alert and no distress  SKIN: there is just a small red area ~1-2 mm size left upper thigh.  PSYCH: mentation appears normal and affect normal/bright      ASSESSMENT/PLAN:     1. Tick bite, initial encounter  Does not meet the criteria for prevention; reviewed in UpToDate.   I do believe her risk from this bite is negligable.  Discussed prevention of tick bites in some detail.  Given patient education materials from UpToDate and from our computer database.  We did decide to go ahead with a titer currently; to have as a baseline for possible future concerns.  - Lyme Disease Shannan with reflex to WB Serum              Jaclyn Baker MD, MD  [facility] [place]      " Walk in

## 2022-10-25 ENCOUNTER — HOSPITAL ENCOUNTER (EMERGENCY)
Facility: CLINIC | Age: 52
Discharge: HOME | End: 2022-10-25
Attending: NURSE PRACTITIONER | Admitting: NURSE PRACTITIONER
Payer: COMMERCIAL

## 2022-10-25 VITALS
SYSTOLIC BLOOD PRESSURE: 144 MMHG | RESPIRATION RATE: 20 BRPM | DIASTOLIC BLOOD PRESSURE: 76 MMHG | HEART RATE: 62 BPM | TEMPERATURE: 98.5 F | OXYGEN SATURATION: 100 %

## 2022-10-25 DIAGNOSIS — B02.9 SHINGLES: ICD-10-CM

## 2022-10-25 PROCEDURE — G0463 HOSPITAL OUTPT CLINIC VISIT: HCPCS | Performed by: NURSE PRACTITIONER

## 2022-10-25 PROCEDURE — 99213 OFFICE O/P EST LOW 20 MIN: CPT | Performed by: NURSE PRACTITIONER

## 2022-10-25 RX ORDER — VALACYCLOVIR HYDROCHLORIDE 1 G/1
1000 TABLET, FILM COATED ORAL 3 TIMES DAILY
Qty: 21 TABLET | Refills: 0 | Status: SHIPPED | OUTPATIENT
Start: 2022-10-25 | End: 2022-12-19

## 2022-10-25 NOTE — ED PROVIDER NOTES
History     Chief Complaint   Patient presents with     Derm Problem     HPI  Brianna Wall is a 52 year old female who presents to the urgent care for evaluation of rash that developed last night. Rash is pruritic and tingling. Feels similar to previous shingles. She is afebrile and otherwise well.     Allergies:  Allergies   Allergen Reactions     Cephalexin Hives     Penicillin allergy skin testing performed on June 12, 2020 was negative.  May use penicillin.  Avoid cephalosporin.       Problem List:    Patient Active Problem List    Diagnosis Date Noted     Intramural leiomyoma of uterus 07/12/2021     Priority: Medium     Cyst of right ovary 07/12/2021     Priority: Medium     Absence of breast 09/04/2020     Priority: Medium     Formatting of this note might be different from the original.  Added automatically from request for surgery [id number]       Bilateral breast cancer (H) 2020     Priority: Medium        Past Medical History:    Past Medical History:   Diagnosis Date     Arthritis      Bilateral breast cancer (H) 2020     Carrier of hereditary disease      Chronic UTI 1992     Duodenal ulcer 1992     Eczema 1992     Female infertility of unspecified origin 1992     Intramural leiomyoma of uterus 7/12/2021     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 2012     Multiple nevi      Osteopenia 2020     Plantar fasciitis 2009     Rosacea      Shingles 2019     Urethral stricture 1992       Past Surgical History:    Past Surgical History:   Procedure Laterality Date     BIOPSY BREAST NEEDLE LOCALIZATION  12/31/2012    Procedure: BIOPSY BREAST NEEDLE LOCALIZATION;  Left Breast Wire Localized Biopsy;  Surgeon: Dinah Lincoln MD;  Location: [facility] OR     BIOPSY OF BREAST, NEEDLE CORE Right 2020    breast cancer     BREAST SURGERY  2012    LCIS     COLONOSCOPY  2012    neg     HC INSERTION INTRAUTERINE DEVICE  2013    Mirena     HC REMOVE INTRAUTERINE DEVICE  2020     LASIK  2003     MASTECTOMY  BILATERAL WITH FREE NIPPLE GRAFTS Bilateral 2020    bilateral breast cancer with negative sentinel lymph nodes; at [place]     MASTECTOMY BILATERAL, INSERT TISSUE EXPANDER BILATERAL, COMBINED  2020     ZZC NONSPECIFIC PROCEDURE      tendon transfer  left wrist.       Family History:    Family History   Problem Relation Age of Onset     Neurologic Disorder Paternal Grandfather         MS     Cerebrovascular Disease Paternal Grandmother      Hypertension Paternal Grandmother      Heart Disease Maternal Grandfather         Enlarged heart     Thyroid Disease Maternal Grandfather      Cancer - colorectal Maternal Grandfather      Prostate Cancer Maternal Grandfather      Breast Cancer Mother         Infiltrating lobular breast cancer, Infiltrating ductal breast cancer, BRCA 1&2 neg     Thyroid Disease Mother      Connective Tissue Disorder Mother         RA     Thyroid Disease Maternal Grandmother      Breast Cancer Maternal Grandmother      Blood Disease Father         hemochromatosis     Connective Tissue Disorder Father         gout     Diabetes Father      Autoimmune Disease Father         [id number]     Breast Cancer Maternal Aunt        Social History:  Marital Status:   [2]  Social History     Tobacco Use     Smoking status: Never     Smokeless tobacco: Never   Substance Use Topics     Alcohol use: Yes     Comment: 1-2 drinks/month     Drug use: No        Medications:    valACYclovir (VALTREX) 1000 mg tablet  Calcium Citrate-Vitamin D (CALCIUM CITRATE + D PO)  Green Tea, Corin sinensis, (GREEN TEA EXTRACT PO)  MULTI-VITAMIN OR TABS  vitamin B complex with vitamin C (VITAMIN  B COMPLEX) TABS tablet  VITAMIN C 1000 MG OR TABS  VITAMIN E 1000 UNIT OR CAPS          Review of Systems   Skin: Positive for rash.   All other systems reviewed and are negative.      Physical Exam   BP: (!) 144/76  Pulse: 62  Temp: 98.5  F (36.9  C)  Resp: 20  SpO2: 100 %      Physical Exam  Constitutional:       General: She is not in  [>50% of Time Spent on Counseling for ____] : Greater than 50% of the encounter time was spent on counseling for [unfilled] [Time Spent: ___ minutes] : I have spent [unfilled] minutes of face to face time with the patient acute distress.     Appearance: Normal appearance.   Eyes:      Conjunctiva/sclera: Conjunctivae normal.      Pupils: Pupils are equal, round, and reactive to light.   Cardiovascular:      Rate and Rhythm: Normal rate.   Pulmonary:      Effort: Pulmonary effort is normal.   Musculoskeletal:         General: Normal range of motion.      Cervical back: Normal range of motion.   Skin:     General: Skin is warm.      Capillary Refill: Capillary refill takes less than 2 seconds.      Comments: Developing vesicular rash on the left of the neck   Neurological:      General: No focal deficit present.      Mental Status: She is alert.         ED Course                 Procedures    No results found for this or any previous visit (from the past 24 hour(s)).    Medications - No data to display    Assessments & Plan (with Medical Decision Making)   Brianna Wall is a 52 year old female who presents to the urgent care for evaluation of rash that developed last night. Rash is pruritic and tingling. Slightly hypertensive, remaining vitals normal. Exam consistent with early shingles. Valacyclovir sent. Return precautions reviewed, all questions answered. Patient is agreeable to plan of care and discharged in no acute distress.     I have reviewed the nursing notes.    I have reviewed the findings, diagnosis, plan and need for follow up with the patient.    New Prescriptions    VALACYCLOVIR (VALTREX) 1000 MG TABLET    Take 1 tablet (1,000 mg) by mouth 3 times daily for 7 days       Final diagnoses:   Shingles       10/25/2022   [facility] EMERGENCY DEPT     Bailey Barry, APRN CNP  10/25/22 5012

## 2022-11-20 ENCOUNTER — HEALTH MAINTENANCE LETTER (OUTPATIENT)
Age: 52
End: 2022-11-20

## 2022-12-18 ASSESSMENT — ENCOUNTER SYMPTOMS
NAUSEA: 0
HEADACHES: 0
JOINT SWELLING: 0
SHORTNESS OF BREATH: 0
DYSURIA: 0
CONSTIPATION: 0
COUGH: 0
BREAST MASS: 0
SORE THROAT: 0
WEAKNESS: 0
HEARTBURN: 0
DIARRHEA: 0
ABDOMINAL PAIN: 0
FREQUENCY: 0
EYE PAIN: 0
PARESTHESIAS: 0
FEVER: 0
CHILLS: 0
HEMATURIA: 0
NERVOUS/ANXIOUS: 0
PALPITATIONS: 0
MYALGIAS: 0
ARTHRALGIAS: 0
DIZZINESS: 0
HEMATOCHEZIA: 0

## 2022-12-19 ENCOUNTER — OFFICE VISIT (OUTPATIENT)
Dept: FAMILY MEDICINE | Facility: CLINIC | Age: 52
End: 2022-12-19
Payer: COMMERCIAL

## 2022-12-19 VITALS
HEIGHT: 66 IN | BODY MASS INDEX: 29.57 KG/M2 | TEMPERATURE: 98.4 F | SYSTOLIC BLOOD PRESSURE: 108 MMHG | WEIGHT: 184 LBS | DIASTOLIC BLOOD PRESSURE: 70 MMHG | HEART RATE: 78 BPM | RESPIRATION RATE: 20 BRPM | OXYGEN SATURATION: 98 %

## 2022-12-19 DIAGNOSIS — Z85.3 PERSONAL HISTORY OF MALIGNANT NEOPLASM OF BREAST: ICD-10-CM

## 2022-12-19 DIAGNOSIS — Z23 HIGH PRIORITY FOR 2019-NCOV VACCINE: ICD-10-CM

## 2022-12-19 DIAGNOSIS — Z00.00 ROUTINE GENERAL MEDICAL EXAMINATION AT A HEALTH CARE FACILITY: Primary | ICD-10-CM

## 2022-12-19 DIAGNOSIS — Z13.220 SCREENING FOR HYPERLIPIDEMIA: ICD-10-CM

## 2022-12-19 PROCEDURE — 82947 ASSAY GLUCOSE BLOOD QUANT: CPT | Performed by: NURSE PRACTITIONER

## 2022-12-19 PROCEDURE — 90471 IMMUNIZATION ADMIN: CPT | Performed by: NURSE PRACTITIONER

## 2022-12-19 PROCEDURE — 80061 LIPID PANEL: CPT | Performed by: NURSE PRACTITIONER

## 2022-12-19 PROCEDURE — 90682 RIV4 VACC RECOMBINANT DNA IM: CPT | Performed by: NURSE PRACTITIONER

## 2022-12-19 PROCEDURE — 0134A COVID-19 VACCINE BIVALENT BOOSTER 18+ (MODERNA): CPT | Performed by: NURSE PRACTITIONER

## 2022-12-19 PROCEDURE — 36415 COLL VENOUS BLD VENIPUNCTURE: CPT | Performed by: NURSE PRACTITIONER

## 2022-12-19 PROCEDURE — 91313 COVID-19 VACCINE BIVALENT BOOSTER 18+ (MODERNA): CPT | Performed by: NURSE PRACTITIONER

## 2022-12-19 PROCEDURE — 99396 PREV VISIT EST AGE 40-64: CPT | Mod: 25 | Performed by: NURSE PRACTITIONER

## 2022-12-19 RX ORDER — FAMOTIDINE 20 MG
TABLET ORAL
COMMUNITY

## 2022-12-19 ASSESSMENT — ENCOUNTER SYMPTOMS
NAUSEA: 0
EYE PAIN: 0
HEARTBURN: 0
FREQUENCY: 0
HEADACHES: 0
CHILLS: 0
BREAST MASS: 0
DIZZINESS: 0
ARTHRALGIAS: 0
PARESTHESIAS: 0
NERVOUS/ANXIOUS: 0
DYSURIA: 0
SHORTNESS OF BREATH: 0
DIARRHEA: 0
SORE THROAT: 0
PALPITATIONS: 0
ABDOMINAL PAIN: 0
HEMATURIA: 0
JOINT SWELLING: 0
COUGH: 0
CONSTIPATION: 0
FEVER: 0
HEMATOCHEZIA: 0
MYALGIAS: 0
WEAKNESS: 0

## 2022-12-19 ASSESSMENT — PAIN SCALES - GENERAL: PAINLEVEL: NO PAIN (0)

## 2022-12-19 NOTE — PROGRESS NOTES
SUBJECTIVE:   CC: Brianna is an 52 year old who presents for preventive health visit.   Patient has been advised of split billing requirements and indicates understanding: Yes     Healthy Habits:     Getting at least 3 servings of Calcium per day:  NO    Bi-annual eye exam:  Yes    Dental care twice a year:  Yes    Sleep apnea or symptoms of sleep apnea:  None    Diet:  Regular (no restrictions)    Frequency of exercise:  2-3 days/week    Duration of exercise:  30-45 minutes    Taking medications regularly:  Yes    Medication side effects:  Not applicable    PHQ-2 Total Score: 0    Additional concerns today:  No         Today's PHQ-2 Score:   PHQ-2 ( 1999 Pfizer) 12/18/2022   Q1: Little interest or pleasure in doing things 0   Q2: Feeling down, depressed or hopeless 0   PHQ-2 Score 0   PHQ-2 Total Score (12-17 Years)- Positive if 3 or more points; Administer PHQ-A if positive -   Q1: Little interest or pleasure in doing things Not at all   Q2: Feeling down, depressed or hopeless Not at all   PHQ-2 Score 0       Social History     Tobacco Use     Smoking status: Never     Smokeless tobacco: Never   Substance Use Topics     Alcohol use: Yes     Comment: ~1 drink per month or less     If you drink alcohol do you typically have >3 drinks per day or >7 drinks per week? No    No flowsheet data found.    Reviewed orders with patient.  Reviewed health maintenance and updated orders accordingly - Yes      Breast Cancer Screening:  Patient has history of breast cancer.  Is having last follow up with [facility] this year.      PAP / HPV Latest Ref Rng & Units 8/10/2020 2/27/2017 10/29/2012   PAP (Historical) - NIL NIL NIL   HPV16 NEG:Negative Negative Negative -   HPV18 NEG:Negative Negative Negative -   HRHPV NEG:Negative Negative Negative -       Gets paps with OB GYN    Reviewed and updated as needed this visit by clinical staff   Tobacco  Allergies  Meds  Problems  Med Hx  Surg Hx  Fam Hx          Reviewed and updated  "as needed this visit by Provider   Tobacco  Allergies  Meds  Problems  Med Hx  Surg Hx  Fam Hx             Review of Systems   Constitutional: Negative for chills and fever.   HENT: Negative for congestion, ear pain, hearing loss and sore throat.    Eyes: Negative for pain and visual disturbance.   Respiratory: Negative for cough and shortness of breath.    Cardiovascular: Negative for chest pain, palpitations and peripheral edema.   Gastrointestinal: Negative for abdominal pain, constipation, diarrhea, heartburn, hematochezia and nausea.   Breasts:  Negative for tenderness, breast mass and discharge.   Genitourinary: Negative for dysuria, frequency, genital sores, hematuria, pelvic pain, urgency, vaginal bleeding and vaginal discharge.   Musculoskeletal: Negative for arthralgias, joint swelling and myalgias.   Skin: Negative for rash.   Neurological: Negative for dizziness, weakness, headaches and paresthesias.   Psychiatric/Behavioral: Negative for mood changes. The patient is not nervous/anxious.      Some stress with building a house,  Feels that her memory is a slight bit changed due to stress.  She will work on setting boundaries and get rest.  If still having concerns she will follow up on memory     OBJECTIVE:   /70 (BP Location: Right arm, Patient Position: Sitting, Cuff Size: Adult Regular)   Pulse 78   Temp 98.4  F (36.9  C) (Oral)   Resp 20   Ht 1.664 m (5' 5.5\")   Wt 83.5 kg (184 lb)   LMP 10/16/2022 (Approximate)   SpO2 98%   BMI 30.15 kg/m    Physical Exam  Constitutional:       General: She is not in acute distress.     Appearance: She is well-developed and well-nourished.   Eyes:      Conjunctiva/sclera: Conjunctivae normal.   Cardiovascular:      Rate and Rhythm: Normal rate and regular rhythm.      Heart sounds: Normal heart sounds.   Pulmonary:      Effort: Pulmonary effort is normal.      Breath sounds: Normal breath sounds.   Abdominal:      General: Bowel sounds are " normal.      Palpations: Abdomen is soft. There is no mass.   Musculoskeletal:      Cervical back: Neck supple.   Lymphadenopathy:      Cervical: No cervical adenopathy.   Skin:     General: Skin is warm and dry.      Findings: No rash.   Psychiatric:         Mood and Affect: Mood and affect normal.           ASSESSMENT/PLAN:       ICD-10-CM    1. Routine general medical examination at a health care facility  Z00.00 Glucose      2. Screening for hyperlipidemia  Z13.220 Lipid panel reflex to direct LDL Fasting      3. High priority for 2019-nCoV vaccine  Z23 COVID-19,PF,PFIZER BOOSTER BIVALENT 12+Yrs      4. Personal history of malignant neoplasm of breast  Z85.3         Return in about 1 year (around 12/19/2023) for Routine Visit.         COUNSELING:  Reviewed preventive health counseling, as reflected in patient instructions       Regular exercise       Healthy diet/nutrition       Immunizations    Vaccinated for: Covid-19 and Influenza             (Karis)menopause management        She reports that she has never smoked. She has never used smokeless tobacco.          YUSUF Mast [facility]

## 2022-12-20 LAB
CHOLEST SERPL-MCNC: 213 MG/DL
FASTING STATUS PATIENT QL REPORTED: YES
GLUCOSE SERPL-MCNC: 75 MG/DL (ref 70–99)
HDLC SERPL-MCNC: 50 MG/DL
LDLC SERPL CALC-MCNC: 148 MG/DL
NONHDLC SERPL-MCNC: 163 MG/DL
TRIGL SERPL-MCNC: 77 MG/DL

## 2023-02-20 ENCOUNTER — OFFICE VISIT (OUTPATIENT)
Dept: FAMILY MEDICINE | Facility: CLINIC | Age: 53
End: 2023-02-20
Payer: COMMERCIAL

## 2023-02-20 VITALS
OXYGEN SATURATION: 98 % | SYSTOLIC BLOOD PRESSURE: 112 MMHG | HEART RATE: 71 BPM | WEIGHT: 193.4 LBS | RESPIRATION RATE: 16 BRPM | TEMPERATURE: 98.8 F | BODY MASS INDEX: 31.08 KG/M2 | HEIGHT: 66 IN | DIASTOLIC BLOOD PRESSURE: 82 MMHG

## 2023-02-20 DIAGNOSIS — R21 RASH AND NONSPECIFIC SKIN ERUPTION: Primary | ICD-10-CM

## 2023-02-20 PROCEDURE — 99213 OFFICE O/P EST LOW 20 MIN: CPT | Performed by: NURSE PRACTITIONER

## 2023-02-20 NOTE — PROGRESS NOTES
"  Assessment & Plan     Rash and nonspecific skin eruption  Appears more consistent with an abrasion than zoster- will have her monitor and let me know if rash is spreading, developing tingling etc.               BMI:   Estimated body mass index is 31.69 kg/m  as calculated from the following:    Height as of this encounter: 1.664 m (5' 5.5\").    Weight as of this encounter: 87.7 kg (193 lb 6.4 oz).       FUTURE APPOINTMENTS:       - Follow-up for annual visit or as needed    See Patient Instructions    No follow-ups on file.    YUSUF Kingsley CNP  [facility]    Shiva Reed is a 52 year old, presenting for the following health issues:  Rash      Rash  Associated symptoms include a rash.   History of Present Illness       Reason for visit:  Possible shingles rash  Symptom onset:  1-3 days ago  Symptoms include:  Small pimple-like rash on right ankle  Symptom intensity:  Mild  Symptom progression:  Staying the same  Had these symptoms before:  Yes  Has tried/received treatment for these symptoms:  Yes  Previous treatment was successful:  Yes  Prior treatment description:  Prescription to [facility]  What makes it worse:  Itching the rash  What makes it better:  Leaving it alone    She eats 2-3 servings of fruits and vegetables daily.She consumes 1 sweetened beverage(s) daily.She exercises with enough effort to increase her heart rate 9 or less minutes per day.  She exercises with enough effort to increase her heart rate 3 or less days per week.   She is taking medications regularly.         Review of Systems   Skin: Positive for rash.      Constitutional, HEENT, cardiovascular, pulmonary, gi and gu systems are negative, except as otherwise noted.      Objective    /82   Pulse 71   Temp 98.8  F (37.1  C) (Tympanic)   Resp 16   Ht 1.664 m (5' 5.5\")   Wt 87.7 kg (193 lb 6.4 oz)   SpO2 98%   BMI 31.69 kg/m    Body mass index is 31.69 kg/m .  Physical Exam   GENERAL: " healthy, alert and no distress  SKIN: lateral-posterior right ankle with 1cm x 2cm erythematous patch with scattered small scabs on the surface, no edema, warmth, tenderness or drainage.   NEURO: Normal strength and tone, mentation intact and speech normal

## 2024-01-08 ENCOUNTER — IMMUNIZATION (OUTPATIENT)
Dept: FAMILY MEDICINE | Facility: CLINIC | Age: 54
End: 2024-01-08
Payer: COMMERCIAL

## 2024-01-08 PROCEDURE — 90682 RIV4 VACC RECOMBINANT DNA IM: CPT

## 2024-01-08 PROCEDURE — 90471 IMMUNIZATION ADMIN: CPT

## 2024-01-30 ENCOUNTER — HOSPITAL ENCOUNTER (EMERGENCY)
Facility: CLINIC | Age: 54
Discharge: HOME | End: 2024-01-30
Attending: PHYSICIAN ASSISTANT | Admitting: PHYSICIAN ASSISTANT
Payer: COMMERCIAL

## 2024-01-30 VITALS
HEART RATE: 72 BPM | OXYGEN SATURATION: 99 % | SYSTOLIC BLOOD PRESSURE: 132 MMHG | RESPIRATION RATE: 17 BRPM | TEMPERATURE: 98 F | DIASTOLIC BLOOD PRESSURE: 73 MMHG

## 2024-01-30 DIAGNOSIS — N30.00 ACUTE CYSTITIS WITHOUT HEMATURIA: ICD-10-CM

## 2024-01-30 LAB
ALBUMIN UR-MCNC: NEGATIVE MG/DL
APPEARANCE UR: CLEAR
BILIRUB UR QL STRIP: NEGATIVE
COLOR UR AUTO: ABNORMAL
GLUCOSE UR STRIP-MCNC: NEGATIVE MG/DL
HGB UR QL STRIP: ABNORMAL
KETONES UR STRIP-MCNC: NEGATIVE MG/DL
LEUKOCYTE ESTERASE UR QL STRIP: ABNORMAL
NITRATE UR QL: NEGATIVE
PH UR STRIP: 7 [PH] (ref 5–7)
RBC URINE: 0 /HPF
SP GR UR STRIP: 1 (ref 1–1.03)
SQUAMOUS EPITHELIAL: 1 /HPF
UROBILINOGEN UR STRIP-MCNC: NORMAL MG/DL
WBC URINE: 6 /HPF

## 2024-01-30 PROCEDURE — G0463 HOSPITAL OUTPT CLINIC VISIT: HCPCS | Performed by: PHYSICIAN ASSISTANT

## 2024-01-30 PROCEDURE — 87086 URINE CULTURE/COLONY COUNT: CPT | Performed by: PHYSICIAN ASSISTANT

## 2024-01-30 PROCEDURE — 81001 URINALYSIS AUTO W/SCOPE: CPT | Performed by: PHYSICIAN ASSISTANT

## 2024-01-30 PROCEDURE — 99213 OFFICE O/P EST LOW 20 MIN: CPT | Performed by: PHYSICIAN ASSISTANT

## 2024-01-30 RX ORDER — SULFAMETHOXAZOLE/TRIMETHOPRIM 800-160 MG
1 TABLET ORAL 2 TIMES DAILY
Qty: 14 TABLET | Refills: 0 | Status: SHIPPED | OUTPATIENT
Start: 2024-01-30 | End: 2024-02-06

## 2024-01-30 NOTE — Clinical Note
Brianna Wall was seen and treated in our emergency department on 1/30/2024.         Sincerely,     [facility] Emergency Dept

## 2024-01-30 NOTE — ED PROVIDER NOTES
History   No chief complaint on file.    MATTHIEU Wall is a 53 year old female who presents urgent care with concern for possible urinary tract infection.  2 days prior to arrival patient developed dysuria, patient frequency, urgency and suprapubic pressure/fullness.  She denies any actual hematuria.  No vaginal itching or discharge.  No fever, chills, nauseous, cough, dyspnea, wheezing, nausea, vomiting, flank pain.  No prior history of nephrolithiasis.  She does have a history of several urinary tract infections most recently treated several years ago and states her current symptoms feel identical.  She also states normally responds well with sulfa drugs.      Allergies:  Allergies   Allergen Reactions    Cephalexin Hives     Penicillin allergy skin testing performed on June 12, 2020 was negative.  May use penicillin.  Avoid cephalosporin.       Problem List:    Patient Active Problem List    Diagnosis Date Noted    Intramural leiomyoma of uterus 07/12/2021     Priority: Medium    Cyst of right ovary 07/12/2021     Priority: Medium    Absence of breast 09/04/2020     Priority: Medium     Formatting of this note might be different from the original.  Added automatically from request for surgery [id number]          Past Medical History:    Past Medical History:   Diagnosis Date    Arthritis     Bilateral breast cancer (H) 2020    Carrier of hereditary disease     Chronic UTI 1992    Duodenal ulcer 1992    Eczema 1992    Female infertility of unspecified origin 1992    Intramural leiomyoma of uterus 07/12/2021    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 2012    Multiple nevi     Osteopenia 2020    Plantar fasciitis 2009    Rosacea     Shingles 2019    Urethral stricture 1992       Past Surgical History:    Past Surgical History:   Procedure Laterality Date    BIOPSY BREAST NEEDLE LOCALIZATION  12/31/2012    Procedure: BIOPSY BREAST NEEDLE LOCALIZATION;  Left Breast Wire Localized Biopsy;  Surgeon:  Dinah Lincoln MD;  Location: [facility] OR    BIOPSY OF BREAST, NEEDLE CORE Right 2020    breast cancer    BREAST SURGERY  2012    LCIS    COLONOSCOPY  2012    neg    COSMETIC SURGERY  2020    HC INSERTION INTRAUTERINE DEVICE  2013    Mirena    HC REMOVE INTRAUTERINE DEVICE  2020    LASIK  2003    MASTECTOMY BILATERAL WITH FREE NIPPLE GRAFTS Bilateral 2020    bilateral breast cancer with negative sentinel lymph nodes; at [place]    MASTECTOMY BILATERAL, INSERT TISSUE EXPANDER BILATERAL, COMBINED  2020    SOFT TISSUE SURGERY  1991, 2020, 2021    ZZC NONSPECIFIC PROCEDURE      tendon transfer  left wrist.       Family History:    Family History   Problem Relation Age of Onset    Neurologic Disorder Paternal Grandfather         MS    Cerebrovascular Disease Paternal Grandmother     Hypertension Paternal Grandmother     Heart Disease Maternal Grandfather         Enlarged heart    Thyroid Disease Maternal Grandfather     Cancer - colorectal Maternal Grandfather     Prostate Cancer Maternal Grandfather     Colon Cancer Maternal Grandfather     Breast Cancer Mother         Infiltrating lobular breast cancer, Infiltrating ductal breast cancer, BRCA 1&2 neg    Thyroid Disease Mother     Connective Tissue Disorder Mother         RA    Anesthesia Reaction Mother         Nausea    Thyroid Disease Maternal Grandmother     Breast Cancer Maternal Grandmother     Blood Disease Father         hemochromatosis    Connective Tissue Disorder Father         gout    Diabetes Father     Autoimmune Disease Father         [id number]    Prostate Cancer Father     Breast Cancer Maternal Aunt        Social History:  Marital Status:   [2]  Social History     Tobacco Use    Smoking status: Never    Smokeless tobacco: Never   Vaping Use    Vaping Use: Never used   Substance Use Topics    Alcohol use: Yes     Comment: ~1 drink per month or less    Drug use: No        Medications:    sulfamethoxazole-trimethoprim (BACTRIM DS) 800-160 MG  tablet  Calcium Citrate-Vitamin D (CALCIUM CITRATE + D PO)  MULTI-VITAMIN OR TABS  vitamin B complex with vitamin C (STRESS TAB) tablet  VITAMIN C 1000 MG OR TABS  Vitamin D (Cholecalciferol) 25 MCG (1000 UT) CAPS  VITAMIN E 1000 UNIT OR CAPS      Review of Systems  CONSTITUTIONAL:NEGATIVE for fever, chills, change in weight  INTEGUMENTARY/SKIN: NEGATIVE for worrisome rashes, moles or lesions  RESP:NEGATIVE for significant cough or SOB  GI: NEGATIVE for nausea, abdominal pain, heartburn, or change in bowel habits  : POSITIVE for dysuria, urinary frequency, urgency, voiding small amounts.  Physical Exam   BP: 132/73  Pulse: 72  Temp: 98  F (36.7  C)  Resp: 17  SpO2: 99 %  Physical Exam  GENERAL APPEARANCE: healthy, alert and no distress  RESP: lungs clear to auscultation - no rales, rhonchi or wheezes  CV: regular rates and rhythm, normal S1 S2, no murmur noted  ABDOMEN:  soft, nontender, no HSM or masses and bowel sounds normal  BACK: No CVA tenderness  SKIN: no suspicious lesions or rashes  ED Course           Procedures              Critical Care time:  none               Results for orders placed or performed during the hospital encounter of 01/30/24 (from the past 24 hour(s))   UA with Microscopic reflex to Culture    Specimen: Urine, Clean Catch   Result Value Ref Range    Color Urine Straw Colorless, Straw, Light Yellow, Yellow    Appearance Urine Clear Clear    Glucose Urine Negative Negative mg/dL    Bilirubin Urine Negative Negative    Ketones Urine Negative Negative mg/dL    Specific Gravity Urine 1.002 (L) 1.003 - 1.035    Blood Urine Small (A) Negative    pH Urine 7.0 5.0 - 7.0    Protein Albumin Urine Negative Negative mg/dL    Urobilinogen Urine Normal Normal, 2.0 mg/dL    Nitrite Urine Negative Negative    Leukocyte Esterase Urine Small (A) Negative    RBC Urine 0 <=2 /HPF    WBC Urine 6 (H) <=5 /HPF    Squamous Epithelials Urine 1 <=1 /HPF    Narrative    Urine Culture not indicated        Medications - No data to display    Assessments & Plan (with Medical Decision Making)     I have reviewed the nursing notes.    I have reviewed the findings, diagnosis, plan and need for follow up with the patient.       New Prescriptions    SULFAMETHOXAZOLE-TRIMETHOPRIM (BACTRIM DS) 800-160 MG TABLET    Take 1 tablet by mouth 2 times daily for 7 days       Final diagnoses:   Acute cystitis without hematuria     53-year-old female presents to urgent care with concern over suspected UTI given 2-day history of dysuria, urinary frequency, urgency, suprapubic pressure.  She had stable vital signs upon arrival.  Urinalysis showed small leukocyte esterase, 6 WBCs however was quite dilute.  Discussed with patient that symptoms are not definitive for infection.  We discussed were/benefits of further evaluation to rule out potential causes of dysuria including pelvic exam to assess for wet prep, sexual transmitted infections and patient deferred at this time.  We discussed were/benefits of empiric treatment with antibiotics given her classic symptoms and patient elected to initiate antibiotics pending culture from today's visit.  She was discharged home with prescription for Bactrim.  Follow-up if no improvement within the next 3 days.  Worrisome reasons to return to the ER/UC sooner discussed.    Disclaimer: This note consists of symbols derived from keyboarding, dictation, and/or voice recognition software. As a result, there may be errors in the script that have gone undetected.  Please consider this when interpreting information found in the chart.        1/30/2024   [facility] EMERGENCY DEPT       Anayeli Barth PA-C  02/03/24 2300

## 2024-01-31 LAB — BACTERIA UR CULT: NORMAL

## 2024-02-03 ENCOUNTER — HEALTH MAINTENANCE LETTER (OUTPATIENT)
Age: 54
End: 2024-02-03

## 2024-02-23 ENCOUNTER — HOSPITAL ENCOUNTER (OUTPATIENT)
Dept: ULTRASOUND IMAGING | Facility: CLINIC | Age: 54
Discharge: HOME | End: 2024-02-23
Admitting: CLINICAL NURSE SPECIALIST
Payer: COMMERCIAL

## 2024-02-23 DIAGNOSIS — Z85.3 PERSONAL HISTORY OF MALIGNANT NEOPLASM OF BREAST: ICD-10-CM

## 2024-02-23 DIAGNOSIS — Z98.82 S/P BILATERAL BREAST IMPLANTS: ICD-10-CM

## 2024-02-23 DIAGNOSIS — N64.59 ABNORMAL BREAST EXAM: ICD-10-CM

## 2024-02-23 DIAGNOSIS — Z90.13 STATUS POST BILATERAL MASTECTOMY: ICD-10-CM

## 2024-02-23 DIAGNOSIS — N63.11 BREAST LUMP ON RIGHT SIDE AT 10 O'CLOCK POSITION: ICD-10-CM

## 2024-02-23 PROCEDURE — 76642 ULTRASOUND BREAST LIMITED: CPT | Mod: RT
